# Patient Record
Sex: FEMALE | Race: WHITE | NOT HISPANIC OR LATINO | URBAN - METROPOLITAN AREA
[De-identification: names, ages, dates, MRNs, and addresses within clinical notes are randomized per-mention and may not be internally consistent; named-entity substitution may affect disease eponyms.]

---

## 2020-09-30 ENCOUNTER — OFFICE VISIT (OUTPATIENT)
Dept: URBAN - METROPOLITAN AREA CLINIC 121 | Facility: CLINIC | Age: 55
End: 2020-09-30

## 2021-03-11 ENCOUNTER — OFFICE VISIT (OUTPATIENT)
Dept: URBAN - METROPOLITAN AREA CLINIC 60 | Facility: CLINIC | Age: 56
End: 2021-03-11

## 2021-04-01 ENCOUNTER — OFFICE VISIT (OUTPATIENT)
Dept: URBAN - METROPOLITAN AREA CLINIC 60 | Facility: CLINIC | Age: 56
End: 2021-04-01

## 2021-04-14 ENCOUNTER — OFFICE VISIT (OUTPATIENT)
Dept: URBAN - METROPOLITAN AREA CLINIC 60 | Facility: CLINIC | Age: 56
End: 2021-04-14

## 2021-04-22 ENCOUNTER — OFFICE VISIT (OUTPATIENT)
Dept: URBAN - METROPOLITAN AREA CLINIC 60 | Facility: CLINIC | Age: 56
End: 2021-04-22

## 2021-04-29 ENCOUNTER — OFFICE VISIT (OUTPATIENT)
Dept: URBAN - METROPOLITAN AREA SURGERY CENTER 4 | Facility: SURGERY CENTER | Age: 56
End: 2021-04-29

## 2021-05-03 LAB — PATHOLOGY (INDENTED REPORT): (no result)

## 2021-05-11 ENCOUNTER — OFFICE VISIT (OUTPATIENT)
Dept: URBAN - METROPOLITAN AREA TELEHEALTH 2 | Facility: TELEHEALTH | Age: 56
End: 2021-05-11

## 2021-05-24 ENCOUNTER — OFFICE VISIT (OUTPATIENT)
Dept: URBAN - METROPOLITAN AREA SURGERY CENTER 4 | Facility: SURGERY CENTER | Age: 56
End: 2021-05-24

## 2021-06-08 ENCOUNTER — OFFICE VISIT (OUTPATIENT)
Dept: URBAN - METROPOLITAN AREA CLINIC 63 | Facility: CLINIC | Age: 56
End: 2021-06-08

## 2021-09-07 ENCOUNTER — OFFICE VISIT (OUTPATIENT)
Dept: URBAN - METROPOLITAN AREA CLINIC 121 | Facility: CLINIC | Age: 56
End: 2021-09-07

## 2021-10-19 ENCOUNTER — OFFICE VISIT (OUTPATIENT)
Dept: URBAN - METROPOLITAN AREA CLINIC 63 | Facility: CLINIC | Age: 56
End: 2021-10-19

## 2022-01-17 ENCOUNTER — OFFICE VISIT (OUTPATIENT)
Dept: URBAN - METROPOLITAN AREA CLINIC 63 | Facility: CLINIC | Age: 57
End: 2022-01-17

## 2022-06-06 ENCOUNTER — OFFICE VISIT (OUTPATIENT)
Dept: URBAN - METROPOLITAN AREA SURGERY CENTER 4 | Facility: SURGERY CENTER | Age: 57
End: 2022-06-06

## 2022-06-27 ENCOUNTER — OFFICE VISIT (OUTPATIENT)
Dept: URBAN - METROPOLITAN AREA SURGERY CENTER 4 | Facility: SURGERY CENTER | Age: 57
End: 2022-06-27

## 2022-06-28 ENCOUNTER — OFFICE VISIT (OUTPATIENT)
Dept: URBAN - METROPOLITAN AREA CLINIC 63 | Facility: CLINIC | Age: 57
End: 2022-06-28

## 2022-07-09 ENCOUNTER — TELEPHONE ENCOUNTER (OUTPATIENT)
Dept: URBAN - METROPOLITAN AREA CLINIC 121 | Facility: CLINIC | Age: 57
End: 2022-07-09

## 2022-07-09 RX ORDER — OMEPRAZOLE 40 MG/1
CAPSULE, DELAYED RELEASE ORAL ONCE A DAY
Refills: 3 | OUTPATIENT
Start: 2013-01-17 | End: 2014-05-21

## 2022-07-09 RX ORDER — OMEPRAZOLE 40 MG/1
CAPSULE, DELAYED RELEASE ORAL ONCE A DAY
Refills: 0 | OUTPATIENT
Start: 2016-07-07 | End: 2016-12-14

## 2022-07-09 RX ORDER — ELUXADOLINE 75 MG/1
TWICE A DAY TABLET, FILM COATED ORAL TWICE A DAY
Refills: 2 | OUTPATIENT
Start: 2017-03-08 | End: 2017-03-08

## 2022-07-09 RX ORDER — MONTELUKAST 10 MG/1
TABLET, FILM COATED ORAL
Refills: 0 | OUTPATIENT
Start: 2017-06-19 | End: 2017-12-19

## 2022-07-09 RX ORDER — ZOLPIDEM TARTRATE 5 MG/1
PRN TABLET, FILM COATED ORAL
Refills: 0 | OUTPATIENT
Start: 2017-06-19 | End: 2017-12-19

## 2022-07-09 RX ORDER — NYSTATIN 100000 U/G
PRN OINTMENT TOPICAL AS NEEDED
Refills: 2 | OUTPATIENT
Start: 2016-07-07 | End: 2016-12-14

## 2022-07-09 RX ORDER — ACYCLOVIR 200 MG/1
CAPSULE ORAL ONCE A DAY
Refills: 0 | OUTPATIENT
Start: 2021-10-19 | End: 2022-01-17

## 2022-07-09 RX ORDER — PREDNISONE 5 MG/1
TABLET ORAL ONCE A DAY
Refills: 0 | OUTPATIENT
Start: 2021-10-19 | End: 2022-01-17

## 2022-07-09 RX ORDER — DESONIDE 0.5 MG/G
CREAM TOPICAL AS NEEDED
Refills: 0 | OUTPATIENT
Start: 2017-03-08 | End: 2017-06-19

## 2022-07-09 RX ORDER — LISINOPRIL 10 MG/1
TABLET ORAL
Refills: 0 | OUTPATIENT
Start: 2015-12-02 | End: 2016-01-19

## 2022-07-09 RX ORDER — FLUTICASONE PROPIONATE 50 UG/1
SPRAY, METERED NASAL
Refills: 0 | OUTPATIENT
Start: 2017-03-08 | End: 2017-06-19

## 2022-07-09 RX ORDER — MESALAMINE 1.2 G/1
TAKE 2 TABLETS BY MOUTH TWICE A DAY TABLET, DELAYED RELEASE ORAL
Refills: 3 | OUTPATIENT
Start: 2018-07-26 | End: 2018-12-12

## 2022-07-09 RX ORDER — ACYCLOVIR 200 MG/1
CAPSULE ORAL ONCE A DAY
Refills: 2 | OUTPATIENT
Start: 2017-01-11 | End: 2017-03-08

## 2022-07-09 RX ORDER — PANCRELIPASE 36000; 180000; 114000 [USP'U]/1; [USP'U]/1; [USP'U]/1
TAKE 2 CAPSULES WITH EACH MEAL AND 1 WITH A SNACK CAPSULE, DELAYED RELEASE PELLETS ORAL
Refills: 3 | OUTPATIENT
Start: 2017-01-17 | End: 2017-03-08

## 2022-07-09 RX ORDER — AMLODIPINE BESYLATE 5 MG/1
TABLET ORAL
Refills: 0 | OUTPATIENT
Start: 2017-01-11 | End: 2017-03-08

## 2022-07-09 RX ORDER — TACROLIMUS 1 MG/1
2MG AM AND 1.5 MG PM CAPSULE, GELATIN COATED ORAL
Refills: 0 | OUTPATIENT
Start: 2017-06-19 | End: 2017-12-19

## 2022-07-09 RX ORDER — DEXTROMETHORPHAN POLISTIREX 30 MG/5 ML
SUSPENSION, EXTENDED RELEASE 12 HR ORAL ONCE A DAY
Refills: 2 | OUTPATIENT
Start: 2016-07-07 | End: 2016-12-14

## 2022-07-09 RX ORDER — MESALAMINE 1.2 G/1
TABLET, DELAYED RELEASE ORAL
Refills: 2 | OUTPATIENT
Start: 2016-01-19 | End: 2016-01-19

## 2022-07-09 RX ORDER — ACYCLOVIR 200 MG/1
CAPSULE ORAL
Refills: 0 | OUTPATIENT
Start: 2014-05-21 | End: 2015-09-01

## 2022-07-09 RX ORDER — OMEPRAZOLE 20 MG/1
TABLET, DELAYED RELEASE ORAL ONCE A DAY
Refills: 0 | OUTPATIENT
Start: 2015-12-02 | End: 2016-01-19

## 2022-07-09 RX ORDER — DEXTROMETHORPHAN POLISTIREX 30 MG/5 ML
SUSPENSION, EXTENDED RELEASE 12 HR ORAL ONCE A DAY
Refills: 2 | OUTPATIENT
Start: 2018-10-03 | End: 2018-12-12

## 2022-07-09 RX ORDER — FLUCONAZOLE 100 MG/1
2 PO TODAY, THEN ONE DAILY FOR 9 MORE DAYS TABLET ORAL TAKE AS DIRECTED
Refills: 0 | OUTPATIENT
Start: 2013-02-25 | End: 2018-10-03

## 2022-07-09 RX ORDER — PREDNISONE 20 MG/1
USE AS DIRECTEDONE DAILY FOR TWO WEEKS, THEN ONE HALF DAILY FOR TWO WEEKS, THEN OFF TABLET ORAL
Refills: 1 | OUTPATIENT
Start: 2015-08-12 | End: 2018-10-03

## 2022-07-09 RX ORDER — FLUTICASONE PROPIONATE 50 UG/1
SPRAY, METERED NASAL
Refills: 0 | OUTPATIENT
Start: 2016-12-14 | End: 2017-01-11

## 2022-07-09 RX ORDER — FEXOFENADINE HYDROCHLORIDE 180 MG/1
TABLET, FILM COATED ORAL
Refills: 0 | OUTPATIENT
Start: 2017-12-19 | End: 2018-10-03

## 2022-07-09 RX ORDER — ALBUTEROL SULFATE 90 UG/1
AEROSOL, METERED RESPIRATORY (INHALATION) ONCE A DAY
Refills: 2 | OUTPATIENT
Start: 2016-03-23 | End: 2016-07-07

## 2022-07-09 RX ORDER — LISINOPRIL 20 MG/1
TABLET ORAL ONCE A DAY
Refills: 0 | OUTPATIENT
Start: 2021-10-19 | End: 2022-01-17

## 2022-07-09 RX ORDER — FEXOFENADINE HYDROCHLORIDE 180 MG/1
TABLET, FILM COATED ORAL
Refills: 0 | OUTPATIENT
Start: 2017-06-19 | End: 2017-12-19

## 2022-07-09 RX ORDER — NORGESTIMATE AND ETHINYL ESTRADIOL 7DAYSX3 LO
KIT ORAL ONCE A DAY
Refills: 0 | OUTPATIENT
Start: 2019-06-04 | End: 2019-06-04

## 2022-07-09 RX ORDER — NYSTATIN 100000 U/G
PRN OINTMENT TOPICAL AS NEEDED
Refills: 2 | OUTPATIENT
Start: 2017-06-19 | End: 2017-12-19

## 2022-07-09 RX ORDER — FLUCONAZOLE 100 MG/1
USE AS DIRECTED: 2 ON FIRST DAY, THEN ONE DAILY FOR 9 DAYS TABLET ORAL
Refills: 0 | OUTPATIENT
Start: 2016-04-11 | End: 2018-10-03

## 2022-07-09 RX ORDER — NYSTATIN 100000 U/G
PRN OINTMENT TOPICAL AS NEEDED
Refills: 2 | OUTPATIENT
Start: 2016-12-14 | End: 2017-01-11

## 2022-07-09 RX ORDER — DESONIDE 0.5 MG/G
CREAM TOPICAL AS NEEDED
Refills: 0 | OUTPATIENT
Start: 2016-03-23 | End: 2016-07-07

## 2022-07-09 RX ORDER — ACYCLOVIR 200 MG/1
CAPSULE ORAL ONCE A DAY
Refills: 2 | OUTPATIENT
Start: 2017-03-08 | End: 2017-06-19

## 2022-07-09 RX ORDER — DEXTROMETHORPHAN POLISTIREX 30 MG/5 ML
SUSPENSION, EXTENDED RELEASE 12 HR ORAL
Refills: 0 | OUTPATIENT
Start: 2014-05-21 | End: 2015-09-01

## 2022-07-09 RX ORDER — OMEPRAZOLE 40 MG/1
CAPSULE, DELAYED RELEASE ORAL
Refills: 0 | OUTPATIENT
Start: 2012-05-02 | End: 2013-01-17

## 2022-07-09 RX ORDER — OMEPRAZOLE 40 MG/1
CAPSULE, DELAYED RELEASE ORAL ONCE A DAY
Refills: 0 | OUTPATIENT
Start: 2016-12-14 | End: 2017-01-11

## 2022-07-09 RX ORDER — FEXOFENADINE HCL 180 MG/1
TABLET, FILM COATED ORAL ONCE A DAY
Refills: 0 | OUTPATIENT
Start: 2016-07-07 | End: 2016-12-14

## 2022-07-09 RX ORDER — DIPHENOXYLATE HCL/ATROPINE 2.5-.025MG
1-2 UP TO FOUR TIMES A DAY TABLET ORAL
Refills: 3 | OUTPATIENT
Start: 2016-07-12 | End: 2016-12-14

## 2022-07-09 RX ORDER — ACYCLOVIR 200 MG/1
CAPSULE ORAL
Refills: 0 | OUTPATIENT
Start: 2012-05-02 | End: 2014-05-21

## 2022-07-09 RX ORDER — LISINOPRIL 10 MG/1
TABLET ORAL
Refills: 2 | OUTPATIENT
Start: 2016-01-19 | End: 2016-03-23

## 2022-07-09 RX ORDER — ACYCLOVIR 200 MG/1
CAPSULE ORAL ONCE A DAY
Refills: 2 | OUTPATIENT
Start: 2016-03-23 | End: 2016-07-07

## 2022-07-09 RX ORDER — PREDNISONE 5 MG/1
TABLET ORAL
Refills: 0 | OUTPATIENT
Start: 2015-09-01 | End: 2015-12-02

## 2022-07-09 RX ORDER — MYCOPHENOLATE MOFETIL 500 MG/1
TABLET, FILM COATED ORAL TWICE A DAY
Refills: 0 | OUTPATIENT
Start: 2019-06-04 | End: 2021-10-19

## 2022-07-09 RX ORDER — ELUXADOLINE 75 MG/1
TWICE A DAY TABLET, FILM COATED ORAL TWICE A DAY
Refills: 1 | OUTPATIENT
Start: 2016-06-01 | End: 2016-07-07

## 2022-07-09 RX ORDER — OMEPRAZOLE 20 MG/1
TABLET, DELAYED RELEASE ORAL ONCE A DAY
Refills: 0 | OUTPATIENT
Start: 2015-06-24 | End: 2015-09-01

## 2022-07-09 RX ORDER — MONTELUKAST 10 MG/1
TABLET, FILM COATED ORAL ONCE A DAY
Refills: 0 | OUTPATIENT
Start: 2018-10-03 | End: 2018-12-12

## 2022-07-09 RX ORDER — FLUCONAZOLE 100 MG/1
2 TODAY, THEN ONE A DAY FOR 9 DAYS TABLET ORAL TAKE AS DIRECTED
Refills: 0 | OUTPATIENT
Start: 2012-05-02 | End: 2018-10-03

## 2022-07-09 RX ORDER — MULTIVITAMIN
TABLET ORAL ONCE A DAY
Refills: 2 | OUTPATIENT
Start: 2017-03-08 | End: 2017-06-19

## 2022-07-09 RX ORDER — ELUXADOLINE 75 MG/1
TWICE A DAY TABLET, FILM COATED ORAL TWICE A DAY
Refills: 2 | OUTPATIENT
Start: 2017-03-09 | End: 2017-03-08

## 2022-07-09 RX ORDER — MYCOPHENOLATE MOFETIL 500 MG/1
TABLET, FILM COATED ORAL
Refills: 0 | OUTPATIENT
Start: 2012-05-02 | End: 2014-05-21

## 2022-07-09 RX ORDER — DIPHENOXYLATE HCL/ATROPINE 2.5-.025MG
TABLET ORAL
Refills: 0 | OUTPATIENT
Start: 2017-06-19 | End: 2017-06-19

## 2022-07-09 RX ORDER — ACYCLOVIR 200 MG/1
CAPSULE ORAL ONCE A DAY
Refills: 2 | OUTPATIENT
Start: 2018-10-03 | End: 2018-12-12

## 2022-07-09 RX ORDER — MYCOPHENOLATE MOFETIL 500 MG/1
TABLET, FILM COATED ORAL
Refills: 0 | OUTPATIENT
Start: 2015-12-02 | End: 2016-01-19

## 2022-07-09 RX ORDER — LISINOPRIL 5 MG/1
TABLET ORAL
Refills: 0 | OUTPATIENT
Start: 2014-05-21 | End: 2015-09-01

## 2022-07-09 RX ORDER — FEXOFENADINE HYDROCHLORIDE 180 MG/1
TABLET, FILM COATED ORAL
Refills: 0 | OUTPATIENT
Start: 2012-05-02 | End: 2014-05-21

## 2022-07-09 RX ORDER — ESTRADIOL 2 MG/1
RING VAGINAL
Refills: 0 | OUTPATIENT
Start: 2016-03-23 | End: 2016-07-07

## 2022-07-09 RX ORDER — LISINOPRIL 5 MG/1
TABLET ORAL
Refills: 0 | OUTPATIENT
Start: 2015-12-02 | End: 2015-12-02

## 2022-07-09 RX ORDER — NORETHINDRONE ACETATE AND ETHINYL ESTRADIOL, ETHINYL ESTRADIOL AND FERROUS FUMARATE 1MG-10(24)
KIT ORAL
Refills: 0 | OUTPATIENT
Start: 2015-12-02 | End: 2016-01-19

## 2022-07-09 RX ORDER — ACYCLOVIR 200 MG/1
CAPSULE ORAL
Refills: 0 | OUTPATIENT
Start: 2015-12-02 | End: 2016-01-19

## 2022-07-09 RX ORDER — TACROLIMUS 0.5 MG/1
2 AM 1 PM CAPSULE ORAL
Refills: 2 | OUTPATIENT
Start: 2016-04-20 | End: 2016-07-07

## 2022-07-09 RX ORDER — ALBUTEROL SULFATE 90 UG/1
AEROSOL, METERED RESPIRATORY (INHALATION) ONCE A DAY
Refills: 2 | OUTPATIENT
Start: 2017-12-19 | End: 2018-10-03

## 2022-07-09 RX ORDER — TACROLIMUS 1 MG/1
CAPSULE ORAL
Refills: 0 | OUTPATIENT
Start: 2018-10-03 | End: 2018-12-12

## 2022-07-09 RX ORDER — OMEPRAZOLE 40 MG/1
CAPSULE, DELAYED RELEASE ORAL ONCE A DAY
Refills: 0 | OUTPATIENT
Start: 2017-03-08 | End: 2017-06-19

## 2022-07-09 RX ORDER — GLUCOSAMINE/MSM/CHONDROIT SULF 500-166.6
TABLET ORAL TWICE A DAY
Refills: 2 | OUTPATIENT
Start: 2017-06-19 | End: 2017-12-19

## 2022-07-09 RX ORDER — CHOLECALCIFEROL (VITAMIN D3) 1250 MCG
CAPSULE ORAL ONCE A DAY
Refills: 0 | OUTPATIENT
Start: 2021-10-19 | End: 2022-01-17

## 2022-07-09 RX ORDER — FERROUS SULFATE 325(65) MG
TABLET ORAL ONCE A DAY
Refills: 2 | OUTPATIENT
Start: 2017-12-19 | End: 2018-10-03

## 2022-07-09 RX ORDER — ALBUTEROL SULFATE 90 UG/1
AEROSOL, METERED RESPIRATORY (INHALATION) ONCE A DAY
Refills: 2 | OUTPATIENT
Start: 2016-01-19 | End: 2016-03-23

## 2022-07-09 RX ORDER — MULTIVITAMIN
TABLET ORAL ONCE A DAY
Refills: 2 | OUTPATIENT
Start: 2017-12-19 | End: 2018-10-03

## 2022-07-09 RX ORDER — MESALAMINE 1.2 G/1
2 TWICE A DAY TABLET, DELAYED RELEASE ORAL
Refills: 3 | OUTPATIENT
Start: 2016-09-08 | End: 2016-12-14

## 2022-07-09 RX ORDER — ACYCLOVIR 200 MG/1
CAPSULE ORAL ONCE A DAY
Refills: 2 | OUTPATIENT
Start: 2017-12-19 | End: 2018-10-03

## 2022-07-09 RX ORDER — GLUCOSAMINE/MSM/CHONDROIT SULF 500-166.6
TABLET ORAL TWICE A DAY
Refills: 2 | OUTPATIENT
Start: 2018-12-12 | End: 2019-06-04

## 2022-07-09 RX ORDER — GLUCOSAMINE/MSM/CHONDROIT SULF 500-166.6
TABLET ORAL TWICE A DAY
Refills: 2 | OUTPATIENT
Start: 2021-10-19 | End: 2022-01-17

## 2022-07-09 RX ORDER — LISINOPRIL 40 MG/1
15 MG AM25 MG PM TABLET ORAL
Refills: 0 | OUTPATIENT
Start: 2016-07-07 | End: 2016-12-14

## 2022-07-09 RX ORDER — FEXOFENADINE HCL 180 MG/1
TABLET ORAL
Refills: 0 | OUTPATIENT
Start: 2015-09-01 | End: 2015-12-02

## 2022-07-09 RX ORDER — TACROLIMUS 0.5 MG/1
CAPSULE ORAL
Refills: 2 | OUTPATIENT
Start: 2016-01-19 | End: 2016-03-23

## 2022-07-09 RX ORDER — PREDNISONE 2.5 MG/1
TABLET ORAL ONCE A DAY
Refills: 0 | OUTPATIENT
Start: 2017-06-19 | End: 2017-12-19

## 2022-07-09 RX ORDER — MESALAMINE 1.2 G/1
TAKE 2 TABLETS BY MOUTH TWICE A DAY TABLET, DELAYED RELEASE ORAL
Refills: 3 | OUTPATIENT
Start: 2017-08-07 | End: 2018-07-26

## 2022-07-09 RX ORDER — OXYCODONE AND ACETAMINOPHEN 5; 325 MG/1; MG/1
PRN TABLET ORAL
Refills: 0 | OUTPATIENT
Start: 2017-03-08 | End: 2017-06-19

## 2022-07-09 RX ORDER — DESONIDE 0.5 MG/G
CREAM TOPICAL AS NEEDED
Refills: 0 | OUTPATIENT
Start: 2016-12-14 | End: 2017-01-11

## 2022-07-09 RX ORDER — ACYCLOVIR 200 MG/1
CAPSULE ORAL ONCE A DAY
Refills: 2 | OUTPATIENT
Start: 2016-12-14 | End: 2016-12-14

## 2022-07-09 RX ORDER — MESALAMINE 1.2 G/1
TABLET, DELAYED RELEASE ORAL
Refills: 0 | OUTPATIENT
Start: 2015-09-01 | End: 2015-12-02

## 2022-07-09 RX ORDER — AZELASTINE HYDROCHLORIDE AND FLUTICASONE PROPIONATE 137; 50 UG/1; UG/1
SPRAY, METERED NASAL TWICE A DAY
Refills: 0 | OUTPATIENT
Start: 2016-06-01 | End: 2016-07-07

## 2022-07-09 RX ORDER — FEXOFENADINE HYDROCHLORIDE 180 MG/1
TABLET, FILM COATED ORAL
Refills: 0 | OUTPATIENT
Start: 2017-03-08 | End: 2017-06-19

## 2022-07-09 RX ORDER — GLUCOSAMINE/MSM/CHONDROIT SULF 500-166.6
TABLET ORAL TWICE A DAY
Refills: 2 | OUTPATIENT
Start: 2017-01-11 | End: 2017-03-08

## 2022-07-09 RX ORDER — ACYCLOVIR 200 MG/1
CAPSULE ORAL
Refills: 2 | OUTPATIENT
Start: 2016-01-19 | End: 2016-03-23

## 2022-07-09 RX ORDER — OMEPRAZOLE 40 MG/1
CAPSULE, DELAYED RELEASE ORAL ONCE A DAY
Refills: 0 | OUTPATIENT
Start: 2017-01-11 | End: 2017-03-08

## 2022-07-09 RX ORDER — ALBUTEROL SULFATE 90 UG/1
AEROSOL, METERED RESPIRATORY (INHALATION) ONCE A DAY
Refills: 2 | OUTPATIENT
Start: 2017-01-11 | End: 2017-03-08

## 2022-07-09 RX ORDER — PREDNISONE 2.5 MG/1
TABLET ORAL ONCE A DAY
Refills: 0 | OUTPATIENT
Start: 2017-03-08 | End: 2017-06-19

## 2022-07-09 RX ORDER — MONTELUKAST 10 MG/1
TABLET, FILM COATED ORAL ONCE A DAY
Refills: 0 | OUTPATIENT
Start: 2019-06-04 | End: 2021-10-19

## 2022-07-09 RX ORDER — MONTELUKAST 10 MG/1
TABLET, FILM COATED ORAL ONCE A DAY
Refills: 0 | OUTPATIENT
Start: 2018-12-12 | End: 2019-06-04

## 2022-07-09 RX ORDER — MYCOPHENOLATE MOFETIL 500 MG/1
TABLET, FILM COATED ORAL TWICE A DAY
Refills: 0 | OUTPATIENT
Start: 2017-12-19 | End: 2018-10-03

## 2022-07-09 RX ORDER — DIPHENOXYLATE HCL/ATROPINE 2.5-.025MG
2 FOUR TIMES A DAY TABLET ORAL
Refills: 3 | OUTPATIENT
Start: 2017-01-05 | End: 2017-08-07

## 2022-07-09 RX ORDER — TACROLIMUS 5 MG/1
CAPSULE ORAL
Refills: 0 | OUTPATIENT
Start: 2012-05-02 | End: 2014-05-21

## 2022-07-09 RX ORDER — MYCOPHENOLATE MOFETIL 500 MG/1
TABLET, FILM COATED ORAL TWICE A DAY
Refills: 0 | OUTPATIENT
Start: 2021-10-19 | End: 2022-01-17

## 2022-07-09 RX ORDER — FERROUS SULFATE 325(65) MG
TABLET ORAL ONCE A DAY
Refills: 2 | OUTPATIENT
Start: 2018-10-03 | End: 2018-12-12

## 2022-07-09 RX ORDER — MYCOPHENOLATE MOFETIL 500 MG/1
TABLET, FILM COATED ORAL TWICE A DAY
Refills: 0 | OUTPATIENT
Start: 2016-07-07 | End: 2016-12-14

## 2022-07-09 RX ORDER — ALBUTEROL SULFATE 90 UG/1
AEROSOL, METERED RESPIRATORY (INHALATION) ONCE A DAY
Refills: 2 | OUTPATIENT
Start: 2016-07-07 | End: 2016-12-14

## 2022-07-09 RX ORDER — PREDNISONE 5 MG/1
TABLET ORAL ONCE A DAY
Refills: 0 | OUTPATIENT
Start: 2019-06-04 | End: 2021-10-19

## 2022-07-09 RX ORDER — ELUXADOLINE 100 MG/1
TWICE A DAY TABLET, FILM COATED ORAL TWICE A DAY
Refills: 3 | OUTPATIENT
Start: 2016-07-07 | End: 2016-07-12

## 2022-07-09 RX ORDER — HYDROCORTISONE ACETATE 25 MG/1
SUPPOSITORY RECTAL TAKE AS DIRECTED
Refills: 3 | OUTPATIENT
Start: 2012-05-02 | End: 2018-10-03

## 2022-07-09 RX ORDER — NAFTIFINE HYDROCHLORIDE 10 MG/G
GEL TOPICAL ONCE A DAY
Refills: 2 | OUTPATIENT
Start: 2016-01-19 | End: 2016-03-23

## 2022-07-09 RX ORDER — FEXOFENADINE HYDROCHLORIDE 180 MG/1
TABLET, FILM COATED ORAL
Refills: 0 | OUTPATIENT
Start: 2016-12-14 | End: 2017-01-11

## 2022-07-09 RX ORDER — MESALAMINE 1.2 G/1
2 TWICE A DAY TABLET, DELAYED RELEASE ORAL
Refills: 3 | OUTPATIENT
Start: 2020-06-03 | End: 2021-05-11

## 2022-07-09 RX ORDER — MONTELUKAST 10 MG/1
TABLET, FILM COATED ORAL
Refills: 0 | OUTPATIENT
Start: 2017-03-08 | End: 2017-06-19

## 2022-07-09 RX ORDER — ZOLPIDEM TARTRATE 5 MG/1
PRN TABLET, FILM COATED ORAL
Refills: 0 | OUTPATIENT
Start: 2017-03-08 | End: 2017-06-19

## 2022-07-09 RX ORDER — GLUCOSAMINE/MSM/CHONDROIT SULF 500-166.6
TABLET ORAL TWICE A DAY
Refills: 2 | OUTPATIENT
Start: 2016-12-14 | End: 2017-01-11

## 2022-07-09 RX ORDER — PREDNISONE 5 MG/1
TABLET ORAL ONCE A DAY
Refills: 0 | OUTPATIENT
Start: 2017-06-19 | End: 2017-12-19

## 2022-07-09 RX ORDER — TACROLIMUS 5 MG/1
2 MG IN AM1.5 MG IN PM CAPSULE, GELATIN COATED ORAL
Refills: 0 | OUTPATIENT
Start: 2016-07-07 | End: 2016-12-14

## 2022-07-09 RX ORDER — ONDANSETRON HYDROCHLORIDE 4 MG/1
THREE TIMES A DAY AS NEEDED FOR NAUSEA TABLET, FILM COATED ORAL THREE TIMES A DAY
Refills: 3 | OUTPATIENT
Start: 2021-04-14 | End: 2021-10-19

## 2022-07-09 RX ORDER — OMEPRAZOLE 40 MG/1
CAPSULE, DELAYED RELEASE ORAL ONCE A DAY
Refills: 0 | OUTPATIENT
Start: 2017-12-19 | End: 2018-10-03

## 2022-07-09 RX ORDER — PREDNISONE 2.5 MG/1
TABLET ORAL ONCE A DAY
Refills: 0 | OUTPATIENT
Start: 2017-01-11 | End: 2017-03-08

## 2022-07-09 RX ORDER — MULTIVITAMIN
TABLET ORAL ONCE A DAY
Refills: 2 | OUTPATIENT
Start: 2017-06-19 | End: 2017-12-19

## 2022-07-09 RX ORDER — TACROLIMUS 0.5 MG/1
CAPSULE ORAL
Refills: 2 | OUTPATIENT
Start: 2016-03-23 | End: 2016-04-20

## 2022-07-09 RX ORDER — MESALAMINE 1.2 G/1
2 TWICE A DAY TABLET, DELAYED RELEASE ORAL
Refills: 5 | OUTPATIENT
Start: 2015-08-12 | End: 2015-12-02

## 2022-07-09 RX ORDER — MESALAMINE 1.2 G/1
2 TWICE A DAY TABLET, DELAYED RELEASE ORAL
Refills: 3 | OUTPATIENT
Start: 2015-09-08 | End: 2016-03-23

## 2022-07-09 RX ORDER — MESALAMINE 1.2 G/1
2 TWICE A DAY TABLET, DELAYED RELEASE ORAL
Refills: 1 | OUTPATIENT
Start: 2019-04-09 | End: 2019-09-30

## 2022-07-09 RX ORDER — MONTELUKAST 10 MG/1
TABLET, FILM COATED ORAL
Refills: 0 | OUTPATIENT
Start: 2016-12-14 | End: 2017-01-11

## 2022-07-09 RX ORDER — MYCOPHENOLATE MOFETIL 500 MG/1
TABLET, FILM COATED ORAL TWICE A DAY
Refills: 0 | OUTPATIENT
Start: 2018-10-03 | End: 2018-12-12

## 2022-07-09 RX ORDER — DIPHENOXYLATE HCL/ATROPINE 2.5-.025MG
2 THREE TIMES A DAY TABLET ORAL
Refills: 5 | OUTPATIENT
Start: 2016-11-03 | End: 2016-12-14

## 2022-07-09 RX ORDER — MONTELUKAST 10 MG/1
TABLET, FILM COATED ORAL ONCE A DAY
Refills: 0 | OUTPATIENT
Start: 2021-10-19 | End: 2022-01-17

## 2022-07-09 RX ORDER — NAFTIFINE HYDROCHLORIDE 10 MG/G
GEL TOPICAL ONCE A DAY
Refills: 2 | OUTPATIENT
Start: 2017-01-11 | End: 2017-03-08

## 2022-07-09 RX ORDER — AZELASTINE HYDROCHLORIDE AND FLUTICASONE PROPIONATE 137; 50 UG/1; UG/1
SPRAY, METERED NASAL TWICE A DAY
Refills: 0 | OUTPATIENT
Start: 2016-07-07 | End: 2016-12-14

## 2022-07-09 RX ORDER — OXYCODONE AND ACETAMINOPHEN 5; 325 MG/1; MG/1
PRN TABLET ORAL
Refills: 0 | OUTPATIENT
Start: 2017-12-19 | End: 2018-10-03

## 2022-07-09 RX ORDER — OMEPRAZOLE 40 MG/1
CAPSULE, DELAYED RELEASE ORAL ONCE A DAY
Refills: 0 | OUTPATIENT
Start: 2018-10-03 | End: 2018-12-12

## 2022-07-09 RX ORDER — FERROUS SULFATE 325(65) MG
TABLET ORAL ONCE A DAY
Refills: 2 | OUTPATIENT
Start: 2017-06-19 | End: 2017-12-19

## 2022-07-09 RX ORDER — GLUCOSAMINE/MSM/CHONDROIT SULF 500-166.6
TABLET ORAL ONCE A DAY
Refills: 2 | OUTPATIENT
Start: 2016-03-23 | End: 2016-07-07

## 2022-07-09 RX ORDER — MESALAMINE 1.2 G/1
2 TWICE A DAY TABLET, DELAYED RELEASE ORAL
Refills: 3 | OUTPATIENT
Start: 2021-05-11 | End: 2022-01-17

## 2022-07-09 RX ORDER — FERROUS SULFATE 325(65) MG
TABLET ORAL
Refills: 2 | OUTPATIENT
Start: 2016-03-23 | End: 2016-07-07

## 2022-07-09 RX ORDER — PREDNISONE 5 MG/1
TABLET ORAL
Refills: 0 | OUTPATIENT
Start: 2014-05-21 | End: 2015-09-01

## 2022-07-09 RX ORDER — FERROUS SULFATE 325(65) MG
TABLET ORAL
Refills: 0 | OUTPATIENT
Start: 2014-05-21 | End: 2015-09-01

## 2022-07-09 RX ORDER — ELUXADOLINE 100 MG/1
TWICE A DAY TABLET, FILM COATED ORAL TWICE A DAY
Refills: 1 | OUTPATIENT
Start: 2016-07-07 | End: 2016-07-07

## 2022-07-09 RX ORDER — NAFTIFINE HYDROCHLORIDE 10 MG/G
GEL TOPICAL ONCE A DAY
Refills: 2 | OUTPATIENT
Start: 2016-12-14 | End: 2017-01-11

## 2022-07-09 RX ORDER — FERROUS SULFATE 325(65) MG
TABLET ORAL
Refills: 2 | OUTPATIENT
Start: 2016-01-19 | End: 2016-03-23

## 2022-07-09 RX ORDER — TACROLIMUS 0.5 MG/1
CAPSULE ORAL
Refills: 0 | OUTPATIENT
Start: 2021-03-18 | End: 2021-10-19

## 2022-07-09 RX ORDER — DIPHENOXYLATE HCL/ATROPINE 2.5-.025MG
USE AS DIRECTED UP TO 8 TABLETS PER DAY AS NEEDED TO CONTROL DIARRHEA TABLET ORAL
Refills: 1 | OUTPATIENT
Start: 2016-06-09 | End: 2016-07-07

## 2022-07-09 RX ORDER — PREDNISONE 2.5 MG/1
TABLET ORAL ONCE A DAY
Refills: 0 | OUTPATIENT
Start: 2016-07-07 | End: 2016-12-14

## 2022-07-09 RX ORDER — NAFTIFINE HYDROCHLORIDE 10 MG/G
GEL TOPICAL ONCE A DAY
Refills: 2 | OUTPATIENT
Start: 2017-12-19 | End: 2018-10-03

## 2022-07-09 RX ORDER — ACYCLOVIR 200 MG/1
CAPSULE ORAL
Refills: 0 | OUTPATIENT
Start: 2014-05-16 | End: 2014-05-21

## 2022-07-09 RX ORDER — DIPHENOXYLATE HCL/ATROPINE 2.5-.025MG
TAKE 2 TABLETS BY MOUTH 4 TIMES A DAY TABLET ORAL
Refills: 2 | OUTPATIENT
Start: 2018-02-16 | End: 2019-06-04

## 2022-07-09 RX ORDER — TACROLIMUS 0.5 MG/1
CAPSULE ORAL
Refills: 0 | OUTPATIENT
Start: 2015-12-02 | End: 2016-01-19

## 2022-07-09 RX ORDER — FEXOFENADINE HYDROCHLORIDE AND PSEUDOEPHEDRINE HYDROCHLORIDE 60; 120 MG/1; MG/1
TABLET, FILM COATED, EXTENDED RELEASE ORAL ONCE A DAY
Refills: 2 | OUTPATIENT
Start: 2016-01-19 | End: 2016-03-23

## 2022-07-09 RX ORDER — PREDNISONE 5 MG/1
TABLET ORAL ONCE A DAY
Refills: 0 | OUTPATIENT
Start: 2018-12-12 | End: 2019-06-04

## 2022-07-09 RX ORDER — POTASSIUM CHLORIDE 1500 MG/1
40 MG TABLET, FILM COATED, EXTENDED RELEASE ORAL ONCE A DAY
Refills: 0 | OUTPATIENT
Start: 2019-06-04 | End: 2021-10-19

## 2022-07-09 RX ORDER — MULTIVITAMIN
TABLET ORAL ONCE A DAY
Refills: 2 | OUTPATIENT
Start: 2016-01-19 | End: 2016-03-23

## 2022-07-09 RX ORDER — MYCOPHENOLATE MOFETIL 500 MG/1
TABLET, FILM COATED ORAL
Refills: 0 | OUTPATIENT
Start: 2015-09-01 | End: 2015-12-02

## 2022-07-09 RX ORDER — MONTELUKAST 10 MG/1
TABLET, FILM COATED ORAL
Refills: 0 | OUTPATIENT
Start: 2017-01-11 | End: 2017-03-08

## 2022-07-09 RX ORDER — PREDNISONE 5 MG/1
TABLET ORAL ONCE A DAY
Refills: 0 | OUTPATIENT
Start: 2016-07-07 | End: 2016-12-14

## 2022-07-09 RX ORDER — FERROUS SULFATE 325(65) MG
TABLET ORAL
Refills: 0 | OUTPATIENT
Start: 2015-09-01 | End: 2015-12-02

## 2022-07-09 RX ORDER — DESONIDE 0.5 MG/G
CREAM TOPICAL AS NEEDED
Refills: 0 | OUTPATIENT
Start: 2017-06-19 | End: 2017-12-19

## 2022-07-09 RX ORDER — PREDNISONE 2.5 MG/1
TABLET ORAL ONCE A DAY
Refills: 0 | OUTPATIENT
Start: 2016-12-14 | End: 2017-01-11

## 2022-07-09 RX ORDER — GLUCOSAMINE/MSM/CHONDROIT SULF 500-166.6
TABLET ORAL ONCE A DAY
Refills: 2 | OUTPATIENT
Start: 2016-01-19 | End: 2016-03-23

## 2022-07-09 RX ORDER — DEXTROMETHORPHAN POLISTIREX 30 MG/5 ML
SUSPENSION, EXTENDED RELEASE 12 HR ORAL
Refills: 2 | OUTPATIENT
Start: 2016-01-19 | End: 2016-03-23

## 2022-07-09 RX ORDER — PANCRELIPASE 36000; 180000; 114000 [USP'U]/1; [USP'U]/1; [USP'U]/1
TAKE 3 CAPSULES WITH EACH MEAL AND 1 WITH A SNACK CAPSULE, DELAYED RELEASE PELLETS ORAL
Refills: 3 | OUTPATIENT
Start: 2018-05-14 | End: 2019-12-16

## 2022-07-09 RX ORDER — POTASSIUM CHLORIDE 1.5 G/1
POWDER, FOR SOLUTION ORAL
Refills: 0 | OUTPATIENT
Start: 2014-05-16 | End: 2014-05-21

## 2022-07-09 RX ORDER — PREDNISONE 5 MG/1
TABLET ORAL
Refills: 0 | OUTPATIENT
Start: 2012-05-02 | End: 2014-05-21

## 2022-07-09 RX ORDER — DESONIDE 0.5 MG/G
CREAM TOPICAL AS NEEDED
Refills: 0 | OUTPATIENT
Start: 2017-12-19 | End: 2018-10-03

## 2022-07-09 RX ORDER — GLUCOSAMINE/MSM/CHONDROIT SULF 500-166.6
TABLET ORAL TWICE A DAY
Refills: 2 | OUTPATIENT
Start: 2019-06-04 | End: 2021-10-19

## 2022-07-09 RX ORDER — TACROLIMUS 1 MG/1
2MG AM AND 1.5 MG PM CAPSULE, GELATIN COATED ORAL
Refills: 0 | OUTPATIENT
Start: 2017-01-11 | End: 2017-03-08

## 2022-07-09 RX ORDER — OMEPRAZOLE 40 MG/1
CAPSULE, DELAYED RELEASE ORAL ONCE A DAY
Refills: 0 | OUTPATIENT
Start: 2019-06-04 | End: 2021-10-19

## 2022-07-09 RX ORDER — NORETHINDRONE ACETATE AND ETHINYL ESTRADIOL, ETHINYL ESTRADIOL AND FERROUS FUMARATE 1MG-10(24)
KIT ORAL
Refills: 2 | OUTPATIENT
Start: 2016-01-19 | End: 2016-03-23

## 2022-07-09 RX ORDER — NYSTATIN 100000 U/G
PRN OINTMENT TOPICAL AS NEEDED
Refills: 2 | OUTPATIENT
Start: 2016-03-23 | End: 2016-07-07

## 2022-07-09 RX ORDER — OMEPRAZOLE 40 MG/1
CAPSULE, DELAYED RELEASE ORAL ONCE A DAY
Refills: 0 | OUTPATIENT
Start: 2017-06-19 | End: 2017-12-19

## 2022-07-09 RX ORDER — DEXTROMETHORPHAN POLISTIREX 30 MG/5 ML
SUSPENSION, EXTENDED RELEASE 12 HR ORAL ONCE A DAY
Refills: 2 | OUTPATIENT
Start: 2017-12-19 | End: 2018-10-03

## 2022-07-09 RX ORDER — AZELASTINE HYDROCHLORIDE AND FLUTICASONE PROPIONATE 137; 50 UG/1; UG/1
SPRAY, METERED NASAL
Refills: 0 | OUTPATIENT
Start: 2014-05-16 | End: 2014-05-21

## 2022-07-09 RX ORDER — FERROUS SULFATE 325(65) MG
TABLET ORAL ONCE A DAY
Refills: 2 | OUTPATIENT
Start: 2017-01-11 | End: 2017-03-08

## 2022-07-09 RX ORDER — GLUCOSAMINE/MSM/CHONDROIT SULF 500-166.6
TABLET ORAL TWICE A DAY
Refills: 2 | OUTPATIENT
Start: 2016-07-07 | End: 2016-12-14

## 2022-07-09 RX ORDER — PREDNISONE 5 MG/1
TABLET ORAL ONCE A DAY
Refills: 0 | OUTPATIENT
Start: 2017-12-19 | End: 2018-10-03

## 2022-07-09 RX ORDER — GLUCOSAMINE/MSM/CHONDROIT SULF 500-166.6
TABLET ORAL ONCE A DAY
Refills: 0 | OUTPATIENT
Start: 2015-12-02 | End: 2016-01-19

## 2022-07-09 RX ORDER — PANTOPRAZOLE SODIUM 40 MG/1
TABLET, DELAYED RELEASE ORAL ONCE A DAY
Refills: 0 | OUTPATIENT
Start: 2022-01-10 | End: 2022-01-17

## 2022-07-09 RX ORDER — MYCOPHENOLATE MOFETIL 500 MG/1
TABLET, FILM COATED ORAL TWICE A DAY
Refills: 0 | OUTPATIENT
Start: 2017-01-11 | End: 2017-03-08

## 2022-07-09 RX ORDER — GABAPENTIN 300 MG/1
CAPSULE ORAL ONCE A DAY
Refills: 0 | OUTPATIENT
Start: 2021-10-19 | End: 2022-01-17

## 2022-07-09 RX ORDER — OMEPRAZOLE 20 MG/1
CAPSULE, DELAYED RELEASE ORAL TWICE A DAY
Refills: 3 | OUTPATIENT
Start: 2014-05-21 | End: 2014-06-18

## 2022-07-09 RX ORDER — OMEPRAZOLE 40 MG/1
CAPSULE, DELAYED RELEASE ORAL ONCE A DAY
Refills: 0 | OUTPATIENT
Start: 2018-12-12 | End: 2019-06-04

## 2022-07-09 RX ORDER — TACROLIMUS 0.5 MG/1
CAPSULE ORAL
Refills: 0 | OUTPATIENT
Start: 2015-09-01 | End: 2015-12-02

## 2022-07-09 RX ORDER — FLUTICASONE PROPIONATE 50 UG/1
SPRAY, METERED NASAL
Refills: 0 | OUTPATIENT
Start: 2017-12-19 | End: 2018-10-03

## 2022-07-09 RX ORDER — LISINOPRIL 5 MG/1
TABLET ORAL
Refills: 0 | OUTPATIENT
Start: 2014-05-16 | End: 2014-05-21

## 2022-07-09 RX ORDER — FEXOFENADINE HCL 180 MG/1
TABLET ORAL
Refills: 0 | OUTPATIENT
Start: 2015-12-02 | End: 2016-01-19

## 2022-07-09 RX ORDER — OXYCODONE AND ACETAMINOPHEN 5; 325 MG/1; MG/1
PRN TABLET ORAL
Refills: 0 | OUTPATIENT
Start: 2017-01-11 | End: 2017-03-08

## 2022-07-09 RX ORDER — LISINOPRIL 5 MG/1
15MG AM/25MG PM TABLET ORAL
Refills: 0 | OUTPATIENT
Start: 2016-03-23 | End: 2016-07-07

## 2022-07-09 RX ORDER — DEXTROMETHORPHAN POLISTIREX 30 MG/5 ML
SUSPENSION, EXTENDED RELEASE 12 HR ORAL ONCE A DAY
Refills: 2 | OUTPATIENT
Start: 2017-03-08 | End: 2017-06-19

## 2022-07-09 RX ORDER — LISINOPRIL 5 MG/1
TABLET ORAL
Refills: 0 | OUTPATIENT
Start: 2015-09-01 | End: 2015-12-02

## 2022-07-09 RX ORDER — TACROLIMUS 0.5 MG/1
CAPSULE ORAL
Refills: 0 | OUTPATIENT
Start: 2014-05-19 | End: 2014-05-21

## 2022-07-09 RX ORDER — MESALAMINE 1.2 G/1
TABLET, DELAYED RELEASE ORAL
Refills: 0 | OUTPATIENT
Start: 2015-12-02 | End: 2016-01-19

## 2022-07-09 RX ORDER — ZOLPIDEM TARTRATE 12.5 MG
TABLET, EXTENDED RELEASE MULTIPHASE ORAL
Refills: 0 | OUTPATIENT
Start: 2012-05-02 | End: 2014-05-21

## 2022-07-09 RX ORDER — NORGESTIMATE AND ETHINYL ESTRADIOL 7DAYSX3 LO
KIT ORAL ONCE A DAY
Refills: 0 | OUTPATIENT
Start: 2018-10-03 | End: 2018-12-12

## 2022-07-09 RX ORDER — ACYCLOVIR 200 MG/1
CAPSULE ORAL
Refills: 0 | OUTPATIENT
Start: 2015-09-01 | End: 2015-12-02

## 2022-07-09 RX ORDER — ACYCLOVIR 200 MG/1
CAPSULE ORAL ONCE A DAY
Refills: 2 | OUTPATIENT
Start: 2017-06-19 | End: 2017-06-19

## 2022-07-09 RX ORDER — FERROUS SULFATE 325(65) MG
TABLET ORAL ONCE A DAY
Refills: 2 | OUTPATIENT
Start: 2018-12-12 | End: 2019-06-04

## 2022-07-09 RX ORDER — POLYETHYLENE GLYCOL 3350, SODIUM SULFATE, SODIUM CHLORIDE, POTASSIUM CHLORIDE, ASCORBIC ACID, SODIUM ASCORBATE 7.5-2.691G
KIT ORAL TAKE AS DIRECTED
Refills: 0 | OUTPATIENT
Start: 2013-01-09 | End: 2018-10-03

## 2022-07-09 RX ORDER — NYSTATIN 100000 U/G
PRN OINTMENT TOPICAL AS NEEDED
Refills: 2 | OUTPATIENT
Start: 2017-12-19 | End: 2018-10-03

## 2022-07-09 RX ORDER — DESONIDE 0.5 MG/G
CREAM TOPICAL AS NEEDED
Refills: 0 | OUTPATIENT
Start: 2017-01-11 | End: 2017-03-08

## 2022-07-09 RX ORDER — MULTIVITAMIN
TABLET ORAL ONCE A DAY
Refills: 2 | OUTPATIENT
Start: 2016-03-23 | End: 2016-07-07

## 2022-07-09 RX ORDER — MULTIVITAMIN
TABLET ORAL ONCE A DAY
Refills: 2 | OUTPATIENT
Start: 2016-07-07 | End: 2016-12-14

## 2022-07-09 RX ORDER — FEXOFENADINE HCL 180 MG/1
TABLET ORAL
Refills: 0 | OUTPATIENT
Start: 2014-05-21 | End: 2015-09-01

## 2022-07-09 RX ORDER — BISMUTH SUBSALICYLATE 525 MG/1
TABLET ORAL TWICE A DAY
Refills: 0 | OUTPATIENT
Start: 2021-10-19 | End: 2022-01-17

## 2022-07-09 RX ORDER — DEXTROMETHORPHAN POLISTIREX 30 MG/5 ML
SUSPENSION, EXTENDED RELEASE 12 HR ORAL
Refills: 0 | OUTPATIENT
Start: 2015-09-01 | End: 2015-12-02

## 2022-07-09 RX ORDER — PREDNISONE 5 MG/1
TABLET ORAL ONCE A DAY
Refills: 0 | OUTPATIENT
Start: 2018-10-03 | End: 2018-12-12

## 2022-07-09 RX ORDER — FERROUS SULFATE 325(65) MG
TABLET ORAL
Refills: 0 | OUTPATIENT
Start: 2015-12-02 | End: 2016-01-19

## 2022-07-09 RX ORDER — OMEPRAZOLE 40 MG/1
CAPSULE, DELAYED RELEASE ORAL ONCE A DAY
Refills: 0 | OUTPATIENT
Start: 2022-01-17 | End: 2022-01-17

## 2022-07-09 RX ORDER — SODIUM PICOSULFATE, MAGNESIUM OXIDE, AND ANHYDROUS CITRIC ACID 10; 3.5; 12 MG/160ML; G/160ML; G/160ML
USE AS DIRECTED FOR COLONOSCOPY PREP LIQUID ORAL
Refills: 0 | OUTPATIENT
Start: 2018-10-03 | End: 2018-12-12

## 2022-07-09 RX ORDER — DIPHENOXYLATE HCL/ATROPINE 2.5-.025MG
2 FOUR TIMES A DAY TABLET ORAL
Refills: 3 | OUTPATIENT
Start: 2017-08-07 | End: 2018-02-16

## 2022-07-09 RX ORDER — SODIUM PICOSULFATE, MAGNESIUM OXIDE, AND ANHYDROUS CITRIC ACID 10; 3.5; 12 MG/160ML; G/160ML; G/160ML
USE AS DIRECTED LIQUID ORAL
Refills: 0 | OUTPATIENT
Start: 2021-04-14 | End: 2021-05-11

## 2022-07-09 RX ORDER — OXYCODONE AND ACETAMINOPHEN 5; 325 MG/1; MG/1
PRN TABLET ORAL
Refills: 0 | OUTPATIENT
Start: 2017-06-19 | End: 2017-12-19

## 2022-07-09 RX ORDER — PREDNISONE 10 MG/1
TABLET ORAL
Refills: 0 | OUTPATIENT
Start: 2015-12-02 | End: 2016-01-19

## 2022-07-09 RX ORDER — AZELASTINE HYDROCHLORIDE AND FLUTICASONE PROPIONATE 137; 50 UG/1; UG/1
SPRAY, METERED NASAL
Refills: 0 | OUTPATIENT
Start: 2014-05-21 | End: 2014-05-21

## 2022-07-09 RX ORDER — ELUXADOLINE 75 MG/1
TWICE A DAY TABLET, FILM COATED ORAL TWICE A DAY
Refills: 2 | OUTPATIENT
Start: 2017-03-09 | End: 2018-10-03

## 2022-07-09 RX ORDER — OMEPRAZOLE 40 MG/1
CAPSULE, DELAYED RELEASE ORAL ONCE A DAY
Refills: 0 | OUTPATIENT
Start: 2021-10-19 | End: 2022-01-17

## 2022-07-09 RX ORDER — NORGESTIMATE AND ETHINYL ESTRADIOL 7DAYSX3 LO
KIT ORAL ONCE A DAY
Refills: 0 | OUTPATIENT
Start: 2018-12-12 | End: 2019-06-04

## 2022-07-09 RX ORDER — MONTELUKAST 10 MG/1
TABLET, FILM COATED ORAL
Refills: 0 | OUTPATIENT
Start: 2017-12-19 | End: 2018-10-03

## 2022-07-09 RX ORDER — NYSTATIN 100000 U/G
PRN OINTMENT TOPICAL ONCE A DAY
Refills: 2 | OUTPATIENT
Start: 2016-01-19 | End: 2016-03-23

## 2022-07-09 RX ORDER — LISINOPRIL 20 MG/1
TABLET ORAL ONCE A DAY
Refills: 0 | OUTPATIENT
Start: 2018-12-12 | End: 2019-06-04

## 2022-07-09 RX ORDER — PREDNISONE 10 MG/1
TABLET ORAL
Refills: 2 | OUTPATIENT
Start: 2016-01-19 | End: 2016-03-23

## 2022-07-09 RX ORDER — MYCOPHENOLATE MOFETIL 500 MG/1
TABLET, FILM COATED ORAL
Refills: 0 | OUTPATIENT
Start: 2014-05-21 | End: 2015-09-01

## 2022-07-09 RX ORDER — PREDNISONE 5 MG/1
TABLET ORAL ONCE A DAY
Refills: 0 | OUTPATIENT
Start: 2016-12-14 | End: 2017-01-11

## 2022-07-09 RX ORDER — POTASSIUM CHLORIDE 1500 MG/1
40 MG TABLET, FILM COATED, EXTENDED RELEASE ORAL ONCE A DAY
Refills: 0 | OUTPATIENT
Start: 2018-10-03 | End: 2018-12-12

## 2022-07-09 RX ORDER — TACROLIMUS 0.5 MG/1
CAPSULE ORAL
Refills: 0 | OUTPATIENT
Start: 2014-05-21 | End: 2015-09-01

## 2022-07-09 RX ORDER — MESALAMINE 1.2 G/1
2.4 BID TABLET, DELAYED RELEASE ORAL
Refills: 0 | OUTPATIENT
Start: 2017-01-11 | End: 2017-03-08

## 2022-07-09 RX ORDER — OMEPRAZOLE 20 MG/1
TABLET, DELAYED RELEASE ORAL ONCE A DAY
Refills: 2 | OUTPATIENT
Start: 2016-01-19 | End: 2016-03-23

## 2022-07-09 RX ORDER — GLUCOSAMINE/MSM/CHONDROIT SULF 500-166.6
TABLET ORAL ONCE A DAY
Refills: 0 | OUTPATIENT
Start: 2015-06-24 | End: 2015-09-01

## 2022-07-09 RX ORDER — PREDNISONE 10 MG/1
7.5 TABLET ORAL
Refills: 2 | OUTPATIENT
Start: 2016-07-07 | End: 2016-07-07

## 2022-07-09 RX ORDER — GLUCOSAMINE/MSM/CHONDROIT SULF 500-166.6
TABLET ORAL TWICE A DAY
Refills: 2 | OUTPATIENT
Start: 2017-03-08 | End: 2017-06-19

## 2022-07-09 RX ORDER — ACYCLOVIR 200 MG/1
CAPSULE ORAL ONCE A DAY
Refills: 2 | OUTPATIENT
Start: 2016-07-07 | End: 2016-12-14

## 2022-07-09 RX ORDER — MESALAMINE 1.2 G/1
TAKE 2 TABLETS BY MOUTH TWICE A DAY TABLET, DELAYED RELEASE ORAL
Refills: 1 | OUTPATIENT
Start: 2019-09-30 | End: 2020-06-03

## 2022-07-09 RX ORDER — ALPRAZOLAM 0.5 MG
TABLET ORAL AS NEEDED
Refills: 0 | OUTPATIENT
Start: 2019-06-04 | End: 2021-10-19

## 2022-07-09 RX ORDER — PREDNISONE 5 MG/1
TABLET ORAL ONCE A DAY
Refills: 0 | OUTPATIENT
Start: 2017-01-11 | End: 2017-03-08

## 2022-07-09 RX ORDER — PREDNISONE 10 MG/1
7.5 TABLET ORAL
Refills: 2 | OUTPATIENT
Start: 2016-03-23 | End: 2016-07-07

## 2022-07-09 RX ORDER — MULTIVITAMIN
TABLET ORAL ONCE A DAY
Refills: 2 | OUTPATIENT
Start: 2017-01-11 | End: 2017-03-08

## 2022-07-09 RX ORDER — MYCOPHENOLATE MOFETIL 500 MG/1
TABLET, FILM COATED ORAL TWICE A DAY
Refills: 0 | OUTPATIENT
Start: 2017-03-08 | End: 2017-06-19

## 2022-07-09 RX ORDER — POTASSIUM CHLORIDE 1500 MG/1
40 MG TABLET, FILM COATED, EXTENDED RELEASE ORAL ONCE A DAY
Refills: 0 | OUTPATIENT
Start: 2018-12-12 | End: 2019-06-04

## 2022-07-09 RX ORDER — PREDNISONE 2.5 MG/1
TABLET ORAL ONCE A DAY
Refills: 0 | OUTPATIENT
Start: 2017-12-19 | End: 2018-10-03

## 2022-07-09 RX ORDER — DEXTROMETHORPHAN POLISTIREX 30 MG/5 ML
SUSPENSION, EXTENDED RELEASE 12 HR ORAL ONCE A DAY
Refills: 2 | OUTPATIENT
Start: 2017-01-11 | End: 2017-03-08

## 2022-07-09 RX ORDER — ESTRADIOL AND NORETHINDRONE ACETATE .5; .1 MG/1; MG/1
TABLET, FILM COATED ORAL ONCE A DAY
Refills: 0 | OUTPATIENT
Start: 2021-10-19 | End: 2022-01-17

## 2022-07-09 RX ORDER — NAFTIFINE HYDROCHLORIDE 10 MG/G
GEL TOPICAL ONCE A DAY
Refills: 2 | OUTPATIENT
Start: 2016-03-23 | End: 2016-07-07

## 2022-07-09 RX ORDER — POTASSIUM CHLORIDE 1.5 G/1
POWDER, FOR SOLUTION ORAL
Refills: 0 | OUTPATIENT
Start: 2014-05-21 | End: 2015-06-24

## 2022-07-09 RX ORDER — NAFTIFINE HYDROCHLORIDE 10 MG/G
GEL TOPICAL ONCE A DAY
Refills: 2 | OUTPATIENT
Start: 2017-03-08 | End: 2017-06-19

## 2022-07-09 RX ORDER — DESONIDE 0.5 MG/G
CREAM TOPICAL AS NEEDED
Refills: 0 | OUTPATIENT
Start: 2016-07-07 | End: 2016-12-14

## 2022-07-09 RX ORDER — TACROLIMUS 1 MG/1
2MG AM AND 1.5 MG PM CAPSULE, GELATIN COATED ORAL
Refills: 0 | OUTPATIENT
Start: 2016-12-14 | End: 2017-01-11

## 2022-07-09 RX ORDER — GLUCOSAMINE/MSM/CHONDROIT SULF 500-166.6
TABLET ORAL ONCE A DAY
Refills: 0 | OUTPATIENT
Start: 2015-09-01 | End: 2015-12-02

## 2022-07-09 RX ORDER — ELUXADOLINE 75 MG/1
TWICE A DAY TABLET, FILM COATED ORAL TWICE A DAY
Refills: 3 | OUTPATIENT
Start: 2017-12-19 | End: 2018-10-03

## 2022-07-09 RX ORDER — FERROUS SULFATE 325(65) MG
TABLET ORAL ONCE A DAY
Refills: 2 | OUTPATIENT
Start: 2016-07-07 | End: 2016-12-14

## 2022-07-09 RX ORDER — ALBUTEROL SULFATE 90 UG/1
AEROSOL, METERED RESPIRATORY (INHALATION) ONCE A DAY
Refills: 2 | OUTPATIENT
Start: 2017-06-19 | End: 2017-12-19

## 2022-07-09 RX ORDER — POTASSIUM CHLORIDE 1500 MG/1
40 MG TABLET, FILM COATED, EXTENDED RELEASE ORAL ONCE A DAY
Refills: 0 | OUTPATIENT
Start: 2021-10-19 | End: 2022-01-17

## 2022-07-09 RX ORDER — MYCOPHENOLATE MOFETIL 500 MG/1
TABLET, FILM COATED ORAL TWICE A DAY
Refills: 0 | OUTPATIENT
Start: 2017-06-19 | End: 2017-12-19

## 2022-07-09 RX ORDER — ALPRAZOLAM 0.5 MG
TABLET ORAL AS NEEDED
Refills: 0 | OUTPATIENT
Start: 2018-10-03 | End: 2018-12-12

## 2022-07-09 RX ORDER — OMEPRAZOLE 20 MG/1
TABLET, DELAYED RELEASE ORAL ONCE A DAY
Refills: 0 | OUTPATIENT
Start: 2015-09-01 | End: 2015-12-02

## 2022-07-09 RX ORDER — FERROUS SULFATE 325(65) MG
TABLET ORAL ONCE A DAY
Refills: 2 | OUTPATIENT
Start: 2016-12-14 | End: 2017-01-11

## 2022-07-09 RX ORDER — ZOLPIDEM TARTRATE 5 MG/1
PRN TABLET, FILM COATED ORAL
Refills: 0 | OUTPATIENT
Start: 2017-12-19 | End: 2018-10-03

## 2022-07-09 RX ORDER — ACYCLOVIR 200 MG/1
CAPSULE ORAL ONCE A DAY
Refills: 2 | OUTPATIENT
Start: 2017-06-19 | End: 2017-12-19

## 2022-07-09 RX ORDER — ZOLPIDEM TARTRATE 5 MG/1
PRN TABLET, FILM COATED ORAL
Refills: 0 | OUTPATIENT
Start: 2017-01-11 | End: 2017-03-08

## 2022-07-09 RX ORDER — ALBUTEROL SULFATE 90 UG/1
AEROSOL, METERED RESPIRATORY (INHALATION) ONCE A DAY
Refills: 2 | OUTPATIENT
Start: 2017-03-08 | End: 2017-06-19

## 2022-07-09 RX ORDER — MYCOPHENOLATE MOFETIL 500 MG/1
TABLET, FILM COATED ORAL TWICE A DAY
Refills: 0 | OUTPATIENT
Start: 2018-12-12 | End: 2019-06-04

## 2022-07-09 RX ORDER — LISINOPRIL 20 MG/1
TABLET ORAL ONCE A DAY
Refills: 0 | OUTPATIENT
Start: 2019-06-04 | End: 2021-10-19

## 2022-07-09 RX ORDER — FERROUS SULFATE 325(65) MG
TABLET ORAL ONCE A DAY
Refills: 2 | OUTPATIENT
Start: 2017-03-08 | End: 2017-06-19

## 2022-07-09 RX ORDER — NYSTATIN 100000 U/G
PRN OINTMENT TOPICAL AS NEEDED
Refills: 2 | OUTPATIENT
Start: 2017-03-08 | End: 2017-06-19

## 2022-07-09 RX ORDER — ELUXADOLINE 75 MG/1
TWICE A DAY TABLET, FILM COATED ORAL TWICE A DAY
Refills: 2 | OUTPATIENT
Start: 2016-12-14 | End: 2017-03-08

## 2022-07-09 RX ORDER — ACYCLOVIR 200 MG/1
CAPSULE ORAL ONCE A DAY
Refills: 2 | OUTPATIENT
Start: 2016-12-14 | End: 2017-01-11

## 2022-07-09 RX ORDER — PREDNISONE 5 MG/1
TABLET ORAL ONCE A DAY
Refills: 0 | OUTPATIENT
Start: 2017-03-08 | End: 2017-06-19

## 2022-07-09 RX ORDER — PANCRELIPASE 36000; 180000; 114000 [USP'U]/1; [USP'U]/1; [USP'U]/1
TAKE 3 CAPSULES WITH EACH MEAL AND 1 WITH A SNACK CAPSULE, DELAYED RELEASE PELLETS ORAL
Refills: 3 | OUTPATIENT
Start: 2017-03-08 | End: 2018-05-14

## 2022-07-09 RX ORDER — TACROLIMUS 1 MG/1
2MG AM AND 1.5 MG PM CAPSULE, GELATIN COATED ORAL
Refills: 0 | OUTPATIENT
Start: 2017-12-19 | End: 2018-10-03

## 2022-07-09 RX ORDER — LISINOPRIL 20 MG/1
TABLET ORAL ONCE A DAY
Refills: 0 | OUTPATIENT
Start: 2018-10-03 | End: 2018-12-12

## 2022-07-09 RX ORDER — NYSTATIN 100000 U/G
PRN OINTMENT TOPICAL AS NEEDED
Refills: 2 | OUTPATIENT
Start: 2017-01-11 | End: 2017-03-08

## 2022-07-09 RX ORDER — MYCOPHENOLATE MOFETIL 500 MG/1
TABLET, FILM COATED ORAL
Refills: 2 | OUTPATIENT
Start: 2016-01-19 | End: 2016-03-23

## 2022-07-09 RX ORDER — GLUCOSAMINE/MSM/CHONDROIT SULF 500-166.6
TABLET ORAL TWICE A DAY
Refills: 2 | OUTPATIENT
Start: 2018-10-03 | End: 2018-12-12

## 2022-07-09 RX ORDER — DEXTROMETHORPHAN POLISTIREX 30 MG/5 ML
SUSPENSION, EXTENDED RELEASE 12 HR ORAL ONCE A DAY
Refills: 2 | OUTPATIENT
Start: 2017-06-19 | End: 2017-12-19

## 2022-07-09 RX ORDER — ALPRAZOLAM 0.5 MG
TABLET ORAL AS NEEDED
Refills: 0 | OUTPATIENT
Start: 2021-10-19 | End: 2022-01-17

## 2022-07-09 RX ORDER — TACROLIMUS 1 MG/1
2MG AM AND 1.5 MG PM CAPSULE, GELATIN COATED ORAL
Refills: 0 | OUTPATIENT
Start: 2017-03-08 | End: 2017-06-19

## 2022-07-09 RX ORDER — MESALAMINE 1.2 G/1
2.4 BID TABLET, DELAYED RELEASE ORAL
Refills: 0 | OUTPATIENT
Start: 2017-06-19 | End: 2017-12-19

## 2022-07-09 RX ORDER — BISMUTH SUBSALICYLATE 525 MG/1
TABLET ORAL TWICE A DAY
Refills: 0 | OUTPATIENT
Start: 2018-12-12 | End: 2021-10-19

## 2022-07-09 RX ORDER — OMEPRAZOLE 40 MG/1
CAPSULE, DELAYED RELEASE ORAL ONCE A DAY
Refills: 0 | OUTPATIENT
Start: 2016-03-23 | End: 2016-07-07

## 2022-07-09 RX ORDER — AMLODIPINE BESYLATE 5 MG/1
TABLET ORAL
Refills: 0 | OUTPATIENT
Start: 2017-03-08 | End: 2017-06-19

## 2022-07-09 RX ORDER — MYCOPHENOLATE MOFETIL 500 MG/1
TABLET, FILM COATED ORAL TWICE A DAY
Refills: 2 | OUTPATIENT
Start: 2016-03-23 | End: 2016-07-07

## 2022-07-09 RX ORDER — BISMUTH SUBSALICYLATE 525 MG/1
TABLET ORAL TWICE A DAY
Refills: 0 | OUTPATIENT
Start: 2018-10-03 | End: 2018-12-12

## 2022-07-09 RX ORDER — NAFTIFINE HYDROCHLORIDE 10 MG/G
GEL TOPICAL ONCE A DAY
Refills: 2 | OUTPATIENT
Start: 2016-07-07 | End: 2016-12-14

## 2022-07-09 RX ORDER — NAFTIFINE HYDROCHLORIDE 10 MG/G
GEL TOPICAL ONCE A DAY
Refills: 2 | OUTPATIENT
Start: 2017-06-19 | End: 2017-12-19

## 2022-07-09 RX ORDER — MESALAMINE 1.2 G/1
2.4 BID TABLET, DELAYED RELEASE ORAL
Refills: 0 | OUTPATIENT
Start: 2016-03-23 | End: 2016-07-07

## 2022-07-09 RX ORDER — ALPRAZOLAM 0.5 MG
TABLET ORAL AS NEEDED
Refills: 0 | OUTPATIENT
Start: 2018-12-12 | End: 2019-06-04

## 2022-07-09 RX ORDER — DEXTROMETHORPHAN POLISTIREX 30 MG/5 ML
SUSPENSION, EXTENDED RELEASE 12 HR ORAL ONCE A DAY
Refills: 2 | OUTPATIENT
Start: 2016-03-23 | End: 2016-07-07

## 2022-07-09 RX ORDER — MESALAMINE 1.2 G/1
2.4 BID TABLET, DELAYED RELEASE ORAL
Refills: 0 | OUTPATIENT
Start: 2016-07-07 | End: 2016-12-14

## 2022-07-09 RX ORDER — FLUTICASONE PROPIONATE 50 UG/1
SPRAY, METERED NASAL
Refills: 0 | OUTPATIENT
Start: 2017-06-19 | End: 2017-12-19

## 2022-07-09 RX ORDER — PANCRELIPASE 36000; 180000; 114000 [USP'U]/1; [USP'U]/1; [USP'U]/1
USE AS DIRECTED TAKE 2 CAPSULES WITH EACH MEAL AND 1 WITH SNACKS UP TO 8 CAPSULES PER DAY CAPSULE, DELAYED RELEASE PELLETS ORAL
Refills: 3 | OUTPATIENT
Start: 2021-04-14 | End: 2022-01-17

## 2022-07-09 RX ORDER — ALBUTEROL SULFATE 90 UG/1
AEROSOL, METERED RESPIRATORY (INHALATION) ONCE A DAY
Refills: 2 | OUTPATIENT
Start: 2016-12-14 | End: 2017-01-11

## 2022-07-09 RX ORDER — MESALAMINE 1.2 G/1
2.4 BID TABLET, DELAYED RELEASE ORAL
Refills: 0 | OUTPATIENT
Start: 2016-12-14 | End: 2017-01-11

## 2022-07-09 RX ORDER — DEXTROMETHORPHAN POLISTIREX 30 MG/5 ML
SUSPENSION, EXTENDED RELEASE 12 HR ORAL ONCE A DAY
Refills: 2 | OUTPATIENT
Start: 2016-12-14 | End: 2017-01-11

## 2022-07-09 RX ORDER — ZOLPIDEM TARTRATE 5 MG/1
PRN TABLET, FILM COATED ORAL
Refills: 0 | OUTPATIENT
Start: 2016-12-14 | End: 2017-01-11

## 2022-07-09 RX ORDER — DEXTROMETHORPHAN POLISTIREX 30 MG/5 ML
SUSPENSION, EXTENDED RELEASE 12 HR ORAL
Refills: 0 | OUTPATIENT
Start: 2015-12-02 | End: 2016-01-19

## 2022-07-09 RX ORDER — AMLODIPINE BESYLATE 5 MG/1
TABLET ORAL
Refills: 0 | OUTPATIENT
Start: 2016-12-14 | End: 2017-01-11

## 2022-07-09 RX ORDER — FLUTICASONE PROPIONATE 50 UG/1
SPRAY, METERED NASAL
Refills: 0 | OUTPATIENT
Start: 2017-01-11 | End: 2017-03-08

## 2022-07-09 RX ORDER — MESALAMINE 1.2 G/1
2.4 BID TABLET, DELAYED RELEASE ORAL
Refills: 0 | OUTPATIENT
Start: 2017-03-08 | End: 2017-06-19

## 2022-07-09 RX ORDER — FEXOFENADINE HYDROCHLORIDE 180 MG/1
TABLET, FILM COATED ORAL
Refills: 0 | OUTPATIENT
Start: 2017-01-11 | End: 2017-03-08

## 2022-07-09 RX ORDER — OXYCODONE AND ACETAMINOPHEN 5; 325 MG/1; MG/1
PRN TABLET ORAL
Refills: 0 | OUTPATIENT
Start: 2016-12-14 | End: 2017-01-11

## 2022-07-09 RX ORDER — GLUCOSAMINE/MSM/CHONDROIT SULF 500-166.6
TABLET ORAL TWICE A DAY
Refills: 2 | OUTPATIENT
Start: 2017-12-19 | End: 2018-10-03

## 2022-07-09 RX ORDER — ONDANSETRON HYDROCHLORIDE 4 MG/1
THREE TIMES A DAY AS NEEDED FOR NAUSEA TABLET, FILM COATED ORAL THREE TIMES A DAY
Refills: 1 | OUTPATIENT
Start: 2018-08-30 | End: 2021-04-14

## 2022-07-09 RX ORDER — FEXOFENADINE HCL 180 MG/1
TABLET ORAL
Refills: 2 | OUTPATIENT
Start: 2016-01-19 | End: 2016-03-23

## 2022-07-09 RX ORDER — MULTIVITAMIN
TABLET ORAL ONCE A DAY
Refills: 2 | OUTPATIENT
Start: 2016-12-14 | End: 2017-01-11

## 2022-07-09 RX ORDER — MYCOPHENOLATE MOFETIL 500 MG/1
TABLET, FILM COATED ORAL TWICE A DAY
Refills: 0 | OUTPATIENT
Start: 2016-12-14 | End: 2017-01-11

## 2022-07-09 RX ORDER — DIPHENOXYLATE HCL/ATROPINE 2.5-.025MG
2 TAB TID TABLET ORAL
Refills: 0 | OUTPATIENT
Start: 2016-12-14 | End: 2016-12-19

## 2022-07-09 RX ORDER — AMLODIPINE BESYLATE 5 MG/1
TABLET ORAL
Refills: 0 | OUTPATIENT
Start: 2017-06-19 | End: 2017-06-19

## 2022-07-10 ENCOUNTER — TELEPHONE ENCOUNTER (OUTPATIENT)
Dept: URBAN - METROPOLITAN AREA CLINIC 121 | Facility: CLINIC | Age: 57
End: 2022-07-10

## 2022-07-10 RX ORDER — SODIUM PICOSULFATE, MAGNESIUM OXIDE, AND ANHYDROUS CITRIC ACID 10; 3.5; 12 MG/160ML; G/160ML; G/160ML
USE AS DIRECTED LIQUID ORAL
Refills: 0 | Status: ACTIVE | COMMUNITY
Start: 2022-01-17

## 2022-07-10 RX ORDER — BISMUTH SUBSALICYLATE 525 MG/1
TABLET ORAL TWICE A DAY
Refills: 0 | Status: ACTIVE | COMMUNITY
Start: 2022-01-17

## 2022-07-10 RX ORDER — TACROLIMUS 0.5 MG/1
CAPSULE ORAL TWICE A DAY
Refills: 0 | Status: ACTIVE | COMMUNITY
Start: 2021-10-19

## 2022-07-10 RX ORDER — ALPRAZOLAM 0.5 MG
TABLET ORAL AS NEEDED
Refills: 0 | Status: ACTIVE | COMMUNITY
Start: 2022-01-17

## 2022-07-10 RX ORDER — ESTRADIOL AND NORETHINDRONE ACETATE .5; .1 MG/1; MG/1
TABLET, FILM COATED ORAL ONCE A DAY
Refills: 0 | Status: ACTIVE | COMMUNITY
Start: 2022-01-17

## 2022-07-10 RX ORDER — PREDNISONE 5 MG/1
TABLET ORAL ONCE A DAY
Refills: 0 | Status: ACTIVE | COMMUNITY
Start: 2022-01-17

## 2022-07-10 RX ORDER — DEXTROMETHORPHAN POLISTIREX 30 MG/5 ML
SUSPENSION, EXTENDED RELEASE 12 HR ORAL ONCE A DAY
Refills: 2 | Status: ACTIVE | COMMUNITY
Start: 2018-12-12

## 2022-07-10 RX ORDER — GLUCOSAMINE/MSM/CHONDROIT SULF 500-166.6
TABLET ORAL TWICE A DAY
Refills: 2 | Status: ACTIVE | COMMUNITY
Start: 2022-01-17

## 2022-07-10 RX ORDER — PANCRELIPASE 36000; 180000; 114000 [USP'U]/1; [USP'U]/1; [USP'U]/1
TAKE 3 CAPSULES WITH EACH MEAL AND 1 WITH A SNACK CAPSULE, DELAYED RELEASE PELLETS ORAL
Refills: 3 | Status: ACTIVE | COMMUNITY
Start: 2019-12-16

## 2022-07-10 RX ORDER — TACROLIMUS 1 MG/1
CAPSULE ORAL
Refills: 0 | Status: ACTIVE | COMMUNITY
Start: 2022-01-14

## 2022-07-10 RX ORDER — ONDANSETRON HYDROCHLORIDE 4 MG/1
USE AS DIRECTED TAKE 1 TABLET 30 MINUTES PRIOR TO EACH HALF OF COLONOSCOPY PREP TO PREVENT NAUSEA TABLET, FILM COATED ORAL
Refills: 0 | Status: ACTIVE | COMMUNITY
Start: 2022-01-17

## 2022-07-10 RX ORDER — MONTELUKAST 10 MG/1
TABLET, FILM COATED ORAL ONCE A DAY
Refills: 0 | Status: ACTIVE | COMMUNITY
Start: 2022-01-17

## 2022-07-10 RX ORDER — POTASSIUM CHLORIDE 1500 MG/1
40 MG TABLET, FILM COATED, EXTENDED RELEASE ORAL ONCE A DAY
Refills: 0 | Status: ACTIVE | COMMUNITY
Start: 2022-01-17

## 2022-07-10 RX ORDER — DIPHENOXYLATE HCL/ATROPINE 2.5-.025MG
1-2 UP TO FOUR TIMES A DAY TABLET ORAL
Refills: 3 | Status: ACTIVE | COMMUNITY
Start: 2016-07-12

## 2022-07-10 RX ORDER — MYCOPHENOLATE MOFETIL 500 MG/1
TABLET, FILM COATED ORAL TWICE A DAY
Refills: 0 | Status: ACTIVE | COMMUNITY
Start: 2022-01-17

## 2022-07-10 RX ORDER — GABAPENTIN 300 MG/1
CAPSULE ORAL ONCE A DAY
Refills: 0 | Status: ACTIVE | COMMUNITY
Start: 2022-01-17

## 2022-07-10 RX ORDER — FERROUS SULFATE 325(65) MG
TABLET ORAL ONCE A DAY
Refills: 2 | Status: ACTIVE | COMMUNITY
Start: 2019-06-04

## 2022-07-10 RX ORDER — PANTOPRAZOLE SODIUM 40 MG/1
TABLET, DELAYED RELEASE ORAL ONCE A DAY
Refills: 0 | Status: ACTIVE | COMMUNITY
Start: 2022-01-17

## 2022-07-10 RX ORDER — LISINOPRIL 20 MG/1
TABLET ORAL ONCE A DAY
Refills: 0 | Status: ACTIVE | COMMUNITY
Start: 2022-01-17

## 2022-07-10 RX ORDER — ACYCLOVIR 200 MG/1
CAPSULE ORAL ONCE A DAY
Refills: 0 | Status: ACTIVE | COMMUNITY
Start: 2022-01-17

## 2022-07-10 RX ORDER — CHOLECALCIFEROL (VITAMIN D3) 1250 MCG
CAPSULE ORAL ONCE A DAY
Refills: 0 | Status: ACTIVE | COMMUNITY
Start: 2022-01-17

## 2022-07-10 RX ORDER — VEDOLIZUMAB 300 MG/5ML
EVERY 8 WEEKS INJECTION, POWDER, LYOPHILIZED, FOR SOLUTION INTRAVENOUS
Refills: 0 | Status: ACTIVE | COMMUNITY
Start: 2022-01-17

## 2022-07-10 RX ORDER — TACROLIMUS 1 MG/1
CAPSULE ORAL
Refills: 0 | Status: ACTIVE | COMMUNITY
Start: 2018-12-12

## 2022-07-10 RX ORDER — PANCRELIPASE 36000; 180000; 114000 [USP'U]/1; [USP'U]/1; [USP'U]/1
USE AS DIRECTED TAKE 2 CAPSULES WITH EACH MEAL AND 1 WITH SNACKS UP TO 8 CAPSULES PER DAY CAPSULE, DELAYED RELEASE PELLETS ORAL
Refills: 3 | Status: ACTIVE | COMMUNITY
Start: 2022-01-17

## 2022-07-10 RX ORDER — ACYCLOVIR 200 MG/1
CAPSULE ORAL ONCE A DAY
Refills: 2 | Status: ACTIVE | COMMUNITY
Start: 2018-12-12

## 2022-07-10 RX ORDER — ELUXADOLINE 75 MG/1
TWICE A DAY TABLET, FILM COATED ORAL TWICE A DAY
Refills: 3 | Status: ACTIVE | COMMUNITY
Start: 2017-06-19

## 2022-07-10 RX ORDER — DIPHENOXYLATE HCL/ATROPINE 2.5-.025MG
USE AS DIRECTED 2 THREE TIMES PER DAY AS NEEDED FOR DIARRHEA TABLET ORAL
Refills: 5 | Status: ACTIVE | COMMUNITY
Start: 2019-06-04

## 2022-07-30 ENCOUNTER — TELEPHONE ENCOUNTER (OUTPATIENT)
Age: 57
End: 2022-07-30

## 2022-07-31 ENCOUNTER — TELEPHONE ENCOUNTER (OUTPATIENT)
Age: 57
End: 2022-07-31

## 2022-09-11 ENCOUNTER — WEB ENCOUNTER (OUTPATIENT)
Dept: URBAN - METROPOLITAN AREA SURGERY CENTER 4 | Facility: SURGERY CENTER | Age: 57
End: 2022-09-11

## 2022-09-12 ENCOUNTER — OFFICE VISIT (OUTPATIENT)
Dept: URBAN - METROPOLITAN AREA SURGERY CENTER 4 | Facility: SURGERY CENTER | Age: 57
End: 2022-09-12
Payer: COMMERCIAL

## 2022-09-12 ENCOUNTER — CLAIMS CREATED FROM THE CLAIM WINDOW (OUTPATIENT)
Dept: URBAN - METROPOLITAN AREA CLINIC 4 | Facility: CLINIC | Age: 57
End: 2022-09-12
Payer: COMMERCIAL

## 2022-09-12 DIAGNOSIS — K52.3 INDETERMINATE COLITIS: ICD-10-CM

## 2022-09-12 DIAGNOSIS — K51.80 OTHER ULCERATIVE COLITIS WITHOUT COMPLICATIONS: ICD-10-CM

## 2022-09-12 DIAGNOSIS — K57.30 DIVERTCULOSIS OF LG INT W/O PERFORATION OR ABSCESS W/O BLEEDING: ICD-10-CM

## 2022-09-12 DIAGNOSIS — K64.0 GRADE I INTERNAL HEMORRHOIDS: ICD-10-CM

## 2022-09-12 DIAGNOSIS — K51.00 ULCERATIVE COLITIS, UNIVERSAL, WITHOUT COMPLICATIONS: ICD-10-CM

## 2022-09-12 PROCEDURE — 45380 COLONOSCOPY AND BIOPSY: CPT | Performed by: INTERNAL MEDICINE

## 2022-09-12 PROCEDURE — G8907 PT DOC NO EVENTS ON DISCHARG: HCPCS | Performed by: INTERNAL MEDICINE

## 2022-09-12 PROCEDURE — G8918 PT W/O PREOP ORDER IV AB PRO: HCPCS | Performed by: INTERNAL MEDICINE

## 2022-09-12 PROCEDURE — 88305 TISSUE EXAM BY PATHOLOGIST: CPT | Performed by: PATHOLOGY

## 2022-09-12 RX ORDER — PANCRELIPASE 36000; 180000; 114000 [USP'U]/1; [USP'U]/1; [USP'U]/1
TAKE 3 CAPSULES WITH EACH MEAL AND 1 WITH A SNACK CAPSULE, DELAYED RELEASE PELLETS ORAL
Refills: 3 | Status: ACTIVE | COMMUNITY
Start: 2019-12-16

## 2022-09-12 RX ORDER — LISINOPRIL 20 MG/1
TABLET ORAL ONCE A DAY
Refills: 0 | Status: ACTIVE | COMMUNITY
Start: 2022-01-17

## 2022-09-12 RX ORDER — ELUXADOLINE 75 MG/1
TWICE A DAY TABLET, FILM COATED ORAL TWICE A DAY
Refills: 3 | Status: ACTIVE | COMMUNITY
Start: 2017-06-19

## 2022-09-12 RX ORDER — SODIUM PICOSULFATE, MAGNESIUM OXIDE, AND ANHYDROUS CITRIC ACID 10; 3.5; 12 MG/160ML; G/160ML; G/160ML
USE AS DIRECTED LIQUID ORAL
Refills: 0 | Status: ACTIVE | COMMUNITY
Start: 2022-01-17

## 2022-09-12 RX ORDER — TACROLIMUS 1 MG/1
CAPSULE ORAL
Refills: 0 | Status: ACTIVE | COMMUNITY
Start: 2018-12-12

## 2022-09-12 RX ORDER — GABAPENTIN 300 MG/1
CAPSULE ORAL ONCE A DAY
Refills: 0 | Status: ACTIVE | COMMUNITY
Start: 2022-01-17

## 2022-09-12 RX ORDER — DIPHENOXYLATE HCL/ATROPINE 2.5-.025MG
1-2 UP TO FOUR TIMES A DAY TABLET ORAL
Refills: 3 | Status: ACTIVE | COMMUNITY
Start: 2016-07-12

## 2022-09-12 RX ORDER — ONDANSETRON HYDROCHLORIDE 4 MG/1
USE AS DIRECTED TAKE 1 TABLET 30 MINUTES PRIOR TO EACH HALF OF COLONOSCOPY PREP TO PREVENT NAUSEA TABLET, FILM COATED ORAL
Refills: 0 | Status: ACTIVE | COMMUNITY
Start: 2022-01-17

## 2022-09-12 RX ORDER — DEXTROMETHORPHAN POLISTIREX 30 MG/5 ML
SUSPENSION, EXTENDED RELEASE 12 HR ORAL ONCE A DAY
Refills: 2 | Status: ACTIVE | COMMUNITY
Start: 2018-12-12

## 2022-09-12 RX ORDER — PREDNISONE 5 MG/1
TABLET ORAL ONCE A DAY
Refills: 0 | Status: ACTIVE | COMMUNITY
Start: 2022-01-17

## 2022-09-12 RX ORDER — MYCOPHENOLATE MOFETIL 500 MG/1
TABLET, FILM COATED ORAL TWICE A DAY
Refills: 0 | Status: ACTIVE | COMMUNITY
Start: 2022-01-17

## 2022-09-12 RX ORDER — CHOLECALCIFEROL (VITAMIN D3) 1250 MCG
CAPSULE ORAL ONCE A DAY
Refills: 0 | Status: ACTIVE | COMMUNITY
Start: 2022-01-17

## 2022-09-12 RX ORDER — ACYCLOVIR 200 MG/1
CAPSULE ORAL ONCE A DAY
Refills: 2 | Status: ACTIVE | COMMUNITY
Start: 2018-12-12

## 2022-09-12 RX ORDER — MONTELUKAST 10 MG/1
TABLET, FILM COATED ORAL ONCE A DAY
Refills: 0 | Status: ACTIVE | COMMUNITY
Start: 2022-01-17

## 2022-09-12 RX ORDER — ALPRAZOLAM 0.5 MG
TABLET ORAL AS NEEDED
Refills: 0 | Status: ACTIVE | COMMUNITY
Start: 2022-01-17

## 2022-09-12 RX ORDER — POTASSIUM CHLORIDE 1500 MG/1
40 MG TABLET, FILM COATED, EXTENDED RELEASE ORAL ONCE A DAY
Refills: 0 | Status: ACTIVE | COMMUNITY
Start: 2022-01-17

## 2022-09-12 RX ORDER — PANTOPRAZOLE SODIUM 40 MG/1
TABLET, DELAYED RELEASE ORAL ONCE A DAY
Refills: 0 | Status: ACTIVE | COMMUNITY
Start: 2022-01-17

## 2022-09-12 RX ORDER — VEDOLIZUMAB 300 MG/5ML
EVERY 8 WEEKS INJECTION, POWDER, LYOPHILIZED, FOR SOLUTION INTRAVENOUS
Refills: 0 | Status: ACTIVE | COMMUNITY
Start: 2022-01-17

## 2022-09-12 RX ORDER — BISMUTH SUBSALICYLATE 525 MG/1
TABLET ORAL TWICE A DAY
Refills: 0 | Status: ACTIVE | COMMUNITY
Start: 2022-01-17

## 2022-09-12 RX ORDER — GLUCOSAMINE/MSM/CHONDROIT SULF 500-166.6
TABLET ORAL TWICE A DAY
Refills: 2 | Status: ACTIVE | COMMUNITY
Start: 2022-01-17

## 2022-09-12 RX ORDER — ESTRADIOL AND NORETHINDRONE ACETATE .5; .1 MG/1; MG/1
TABLET, FILM COATED ORAL ONCE A DAY
Refills: 0 | Status: ACTIVE | COMMUNITY
Start: 2022-01-17

## 2022-09-12 RX ORDER — FERROUS SULFATE 325(65) MG
TABLET ORAL ONCE A DAY
Refills: 2 | Status: ACTIVE | COMMUNITY
Start: 2019-06-04

## 2022-09-12 RX ORDER — TACROLIMUS 0.5 MG/1
CAPSULE ORAL TWICE A DAY
Refills: 0 | Status: ACTIVE | COMMUNITY
Start: 2021-10-19

## 2022-09-15 PROBLEM — 235746007 INDETERMINATE COLITIS: Status: ACTIVE | Noted: 2022-09-15

## 2022-09-15 PROBLEM — 442159003 CHRONIC ULCERATIVE PANCOLITIS: Status: ACTIVE | Noted: 2022-09-15

## 2022-09-15 PROBLEM — 398050005 DIVERTICULAR DISEASE OF COLON: Status: ACTIVE | Noted: 2022-09-15

## 2022-09-23 ENCOUNTER — OFFICE VISIT (OUTPATIENT)
Dept: URBAN - METROPOLITAN AREA CLINIC 63 | Facility: CLINIC | Age: 57
End: 2022-09-23
Payer: COMMERCIAL

## 2022-09-23 VITALS
WEIGHT: 116 LBS | HEART RATE: 57 BPM | TEMPERATURE: 97.6 F | SYSTOLIC BLOOD PRESSURE: 120 MMHG | BODY MASS INDEX: 20.55 KG/M2 | DIASTOLIC BLOOD PRESSURE: 80 MMHG | HEIGHT: 63 IN | OXYGEN SATURATION: 97 %

## 2022-09-23 DIAGNOSIS — Z94.4 LIVER TRANSPLANT RECIPIENT: ICD-10-CM

## 2022-09-23 DIAGNOSIS — K86.2 PANCREAS CYST: ICD-10-CM

## 2022-09-23 DIAGNOSIS — K51.00 ULCERATIVE PANCOLITIS WITHOUT COMPLICATION: ICD-10-CM

## 2022-09-23 DIAGNOSIS — Z94.0 KIDNEY TRANSPLANT RECIPIENT: ICD-10-CM

## 2022-09-23 DIAGNOSIS — K86.81 EXOCRINE PANCREATIC INSUFFICIENCY: ICD-10-CM

## 2022-09-23 PROBLEM — 47367009: Status: ACTIVE | Noted: 2022-09-23

## 2022-09-23 PROBLEM — 161671001: Status: ACTIVE | Noted: 2022-09-23

## 2022-09-23 PROBLEM — 31258000: Status: ACTIVE | Noted: 2022-09-23

## 2022-09-23 PROBLEM — 444548001: Status: ACTIVE | Noted: 2022-09-23

## 2022-09-23 PROCEDURE — 99214 OFFICE O/P EST MOD 30 MIN: CPT | Performed by: NURSE PRACTITIONER

## 2022-09-23 RX ORDER — POTASSIUM CHLORIDE 1500 MG/1
40 MG TABLET, FILM COATED, EXTENDED RELEASE ORAL ONCE A DAY
Refills: 0 | Status: ACTIVE | COMMUNITY
Start: 2022-01-17

## 2022-09-23 RX ORDER — PREDNISONE 5 MG/1
TABLET ORAL ONCE A DAY
Refills: 0 | Status: ACTIVE | COMMUNITY
Start: 2022-01-17

## 2022-09-23 RX ORDER — BISMUTH SUBSALICYLATE 525 MG/1
TABLET ORAL TWICE A DAY
Refills: 0 | COMMUNITY
Start: 2022-01-17

## 2022-09-23 RX ORDER — PANTOPRAZOLE SODIUM 40 MG/1
TABLET, DELAYED RELEASE ORAL ONCE A DAY
Refills: 0 | Status: ACTIVE | COMMUNITY
Start: 2022-01-17

## 2022-09-23 RX ORDER — MYCOPHENOLATE MOFETIL 500 MG/1
TABLET, FILM COATED ORAL TWICE A DAY
Refills: 0 | Status: ACTIVE | COMMUNITY
Start: 2022-01-17

## 2022-09-23 RX ORDER — GLUCOSAMINE/MSM/CHONDROIT SULF 500-166.6
TABLET ORAL TWICE A DAY
Refills: 2 | Status: ACTIVE | COMMUNITY
Start: 2022-01-17

## 2022-09-23 RX ORDER — FERROUS SULFATE 325(65) MG
TABLET ORAL ONCE A DAY
Refills: 2 | Status: ACTIVE | COMMUNITY
Start: 2019-06-04

## 2022-09-23 RX ORDER — GABAPENTIN 300 MG/1
CAPSULE ORAL ONCE A DAY
Refills: 0 | Status: ACTIVE | COMMUNITY
Start: 2022-01-17

## 2022-09-23 RX ORDER — LISINOPRIL 20 MG/1
TABLET ORAL ONCE A DAY
Refills: 0 | Status: ACTIVE | COMMUNITY
Start: 2022-01-17

## 2022-09-23 RX ORDER — PANCRELIPASE 36000; 180000; 114000 [USP'U]/1; [USP'U]/1; [USP'U]/1
TAKE 3 CAPSULES WITH EACH MEAL AND 1 WITH A SNACK CAPSULE, DELAYED RELEASE PELLETS ORAL
Refills: 3 | Status: ACTIVE | COMMUNITY
Start: 2019-12-16

## 2022-09-23 RX ORDER — ALPRAZOLAM 0.5 MG
TABLET ORAL AS NEEDED
Refills: 0 | Status: ACTIVE | COMMUNITY
Start: 2022-01-17

## 2022-09-23 RX ORDER — FEXOFENADINE HYDROCHLORIDE 180 MG/1
1 TABLET SWALLOW WHOLE WITH WATER; DO NOT TAKE WITH FRUIT JUICES TABLET ORAL ONCE A DAY
Status: ACTIVE | COMMUNITY

## 2022-09-23 RX ORDER — PANCRELIPASE 36000; 180000; 114000 [USP'U]/1; [USP'U]/1; [USP'U]/1
AS DIRECTED CAPSULE, DELAYED RELEASE PELLETS ORAL
Qty: 750 | Refills: 3 | OUTPATIENT
Start: 2022-09-23 | End: 2023-09-18

## 2022-09-23 RX ORDER — VEDOLIZUMAB 300 MG/5ML
EVERY 8 WEEKS INJECTION, POWDER, LYOPHILIZED, FOR SOLUTION INTRAVENOUS
Refills: 0 | Status: ACTIVE | COMMUNITY
Start: 2022-01-17

## 2022-09-23 RX ORDER — ACYCLOVIR 200 MG/1
CAPSULE ORAL ONCE A DAY
Refills: 2 | Status: ACTIVE | COMMUNITY
Start: 2018-12-12

## 2022-09-23 RX ORDER — ESTRADIOL AND NORETHINDRONE ACETATE .5; .1 MG/1; MG/1
TABLET, FILM COATED ORAL ONCE A DAY
Refills: 0 | Status: ACTIVE | COMMUNITY
Start: 2022-01-17

## 2022-09-23 RX ORDER — TACROLIMUS 1 MG/1
CAPSULE ORAL
Refills: 0 | Status: ACTIVE | COMMUNITY
Start: 2018-12-12

## 2022-09-23 RX ORDER — DEXTROMETHORPHAN POLISTIREX 30 MG/5 ML
SUSPENSION, EXTENDED RELEASE 12 HR ORAL ONCE A DAY
Refills: 2 | COMMUNITY
Start: 2018-12-12

## 2022-09-23 RX ORDER — MONTELUKAST 10 MG/1
TABLET, FILM COATED ORAL ONCE A DAY
Refills: 0 | Status: ACTIVE | COMMUNITY
Start: 2022-01-17

## 2022-09-23 RX ORDER — MYCOPHENOLATE MOFETIL 500 MG/1
TABLET, FILM COATED ORAL TWICE A DAY
Refills: 0 | COMMUNITY
Start: 2022-01-17

## 2022-09-23 NOTE — HPI-LIVER TRANSPLANT FOLLOW-UP
Liver transplant 30 years ago secondary to PSC. Following with [facility] transplant annually. Gets labs and they follow Tac levels every 2 months. Also is a kidney transplant recipient

## 2022-09-23 NOTE — HPI-HPI
Having 3-4 bm's per day with more form since starting entyvio. Previously having 7-8/day. Taking creon 3 with meals. Also sees Dr Flores at [facility] . Clinically doing great.

## 2022-09-23 NOTE — HPI-ULCERATIVE COLITIS
Last Colonoscopy:  2022 Quiescent colitis only on exam Last colonoscopy with us  showed mild pancolitis, no adenomas.  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

## 2023-01-23 ENCOUNTER — NEW PATIENT (OUTPATIENT)
Dept: URBAN - METROPOLITAN AREA CLINIC 26 | Facility: CLINIC | Age: 58
End: 2023-01-23

## 2023-01-23 VITALS
WEIGHT: 116 LBS | SYSTOLIC BLOOD PRESSURE: 127 MMHG | HEIGHT: 63 IN | BODY MASS INDEX: 20.55 KG/M2 | HEART RATE: 65 BPM | DIASTOLIC BLOOD PRESSURE: 87 MMHG

## 2023-01-23 DIAGNOSIS — H35.373: ICD-10-CM

## 2023-01-23 DIAGNOSIS — H04.123: ICD-10-CM

## 2023-01-23 DIAGNOSIS — H35.352: ICD-10-CM

## 2023-01-23 PROCEDURE — 99204 OFFICE O/P NEW MOD 45 MIN: CPT

## 2023-01-23 PROCEDURE — 92250 FUNDUS PHOTOGRAPHY W/I&R: CPT

## 2023-01-23 PROCEDURE — 92134 CPTRZ OPH DX IMG PST SGM RTA: CPT

## 2023-01-23 RX ORDER — KETOROLAC TROMETHAMINE 5 MG/ML: 1 SOLUTION OPHTHALMIC

## 2023-01-23 RX ORDER — PREDNISOLONE ACETATE 10 MG/ML: 1 SUSPENSION/ DROPS OPHTHALMIC

## 2023-01-23 ASSESSMENT — VISUAL ACUITY
OD_SC: 20/60-2
OD_CC: 20/20-2
OS_SC: 20/60-2
OS_CC: 20/40

## 2023-01-23 ASSESSMENT — TONOMETRY
OD_IOP_MMHG: 12
OS_IOP_MMHG: 13

## 2023-04-10 ENCOUNTER — COMPREHENSIVE EXAM (OUTPATIENT)
Dept: URBAN - METROPOLITAN AREA CLINIC 26 | Facility: CLINIC | Age: 58
End: 2023-04-10

## 2023-04-10 VITALS — HEIGHT: 63 IN | WEIGHT: 115 LBS | BODY MASS INDEX: 20.37 KG/M2

## 2023-04-10 DIAGNOSIS — H04.123: ICD-10-CM

## 2023-04-10 DIAGNOSIS — H35.352: ICD-10-CM

## 2023-04-10 DIAGNOSIS — H35.373: ICD-10-CM

## 2023-04-10 PROCEDURE — 92250 FUNDUS PHOTOGRAPHY W/I&R: CPT

## 2023-04-10 PROCEDURE — 92012 INTRM OPH EXAM EST PATIENT: CPT

## 2023-04-10 PROCEDURE — 92134 CPTRZ OPH DX IMG PST SGM RTA: CPT

## 2023-04-10 RX ORDER — PREDNISOLONE ACETATE 10 MG/ML: 1 SUSPENSION/ DROPS OPHTHALMIC

## 2023-04-10 ASSESSMENT — VISUAL ACUITY
OS_CC: 20/40-1
OD_CC: 20/25-1

## 2023-04-10 ASSESSMENT — TONOMETRY
OD_IOP_MMHG: 9
OS_IOP_MMHG: 10

## 2023-05-03 ENCOUNTER — TELEPHONE ENCOUNTER (OUTPATIENT)
Dept: URBAN - METROPOLITAN AREA CLINIC 63 | Facility: CLINIC | Age: 58
End: 2023-05-03

## 2023-05-05 ENCOUNTER — TELEPHONE ENCOUNTER (OUTPATIENT)
Dept: URBAN - METROPOLITAN AREA CLINIC 63 | Facility: CLINIC | Age: 58
End: 2023-05-05

## 2023-06-06 ENCOUNTER — OFFICE VISIT (OUTPATIENT)
Dept: URBAN - METROPOLITAN AREA CLINIC 63 | Facility: CLINIC | Age: 58
End: 2023-06-06
Payer: COMMERCIAL

## 2023-06-06 ENCOUNTER — WEB ENCOUNTER (OUTPATIENT)
Dept: URBAN - METROPOLITAN AREA CLINIC 63 | Facility: CLINIC | Age: 58
End: 2023-06-06

## 2023-06-06 VITALS
TEMPERATURE: 98.3 F | WEIGHT: 120 LBS | DIASTOLIC BLOOD PRESSURE: 80 MMHG | OXYGEN SATURATION: 97 % | BODY MASS INDEX: 21.26 KG/M2 | HEART RATE: 60 BPM | HEIGHT: 63 IN | SYSTOLIC BLOOD PRESSURE: 120 MMHG

## 2023-06-06 DIAGNOSIS — Z94.0 KIDNEY TRANSPLANT RECIPIENT: ICD-10-CM

## 2023-06-06 DIAGNOSIS — K83.01 PRIMARY SCLEROSING CHOLANGITIS: ICD-10-CM

## 2023-06-06 DIAGNOSIS — Z94.4 LIVER TRANSPLANT RECIPIENT: ICD-10-CM

## 2023-06-06 DIAGNOSIS — K86.2 PANCREAS CYST: ICD-10-CM

## 2023-06-06 DIAGNOSIS — K86.81 EXOCRINE PANCREATIC INSUFFICIENCY: ICD-10-CM

## 2023-06-06 DIAGNOSIS — K51.00 ULCERATIVE PANCOLITIS WITHOUT COMPLICATION: ICD-10-CM

## 2023-06-06 PROCEDURE — 99214 OFFICE O/P EST MOD 30 MIN: CPT | Performed by: NURSE PRACTITIONER

## 2023-06-06 RX ORDER — ONDANSETRON HYDROCHLORIDE 4 MG/1
1 TABLET TABLET, FILM COATED ORAL
Qty: 2 | Refills: 0 | OUTPATIENT
Start: 2023-06-06

## 2023-06-06 RX ORDER — PANCRELIPASE 36000; 180000; 114000 [USP'U]/1; [USP'U]/1; [USP'U]/1
AS DIRECTED CAPSULE, DELAYED RELEASE PELLETS ORAL
Qty: 750 | Refills: 3 | Status: ACTIVE | COMMUNITY
Start: 2022-09-23 | End: 2023-09-18

## 2023-06-06 RX ORDER — MONTELUKAST 10 MG/1
TABLET, FILM COATED ORAL ONCE A DAY
Refills: 0 | Status: ACTIVE | COMMUNITY
Start: 2022-01-17

## 2023-06-06 RX ORDER — POTASSIUM CHLORIDE 1500 MG/1
40 MG TABLET, FILM COATED, EXTENDED RELEASE ORAL ONCE A DAY
Refills: 0 | Status: ACTIVE | COMMUNITY
Start: 2022-01-17

## 2023-06-06 RX ORDER — DEXTROMETHORPHAN POLISTIREX 30 MG/5 ML
SUSPENSION, EXTENDED RELEASE 12 HR ORAL ONCE A DAY
Refills: 2 | Status: ACTIVE | COMMUNITY
Start: 2018-12-12

## 2023-06-06 RX ORDER — PANCRELIPASE 36000; 180000; 114000 [USP'U]/1; [USP'U]/1; [USP'U]/1
AS DIRECTED CAPSULE, DELAYED RELEASE PELLETS ORAL
Qty: 1000 | Refills: 3 | OUTPATIENT
Start: 2023-06-06 | End: 2024-05-31

## 2023-06-06 RX ORDER — ALPRAZOLAM 0.5 MG
TABLET ORAL AS NEEDED
Refills: 0 | Status: ACTIVE | COMMUNITY
Start: 2022-01-17

## 2023-06-06 RX ORDER — SODIUM PICOSULFATE, MAGNESIUM OXIDE, AND ANHYDROUS CITRIC ACID 12; 3.5; 1 G/175ML; G/175ML; MG/175ML
175 ML THE FIRST DOSE THE EVENING BEFORE AND SECOND DOSE THE MORNING OF COLONOSCOPY LIQUID ORAL ONCE A DAY
Qty: 350 | OUTPATIENT
Start: 2023-06-06 | End: 2023-06-08

## 2023-06-06 RX ORDER — GABAPENTIN 300 MG/1
CAPSULE ORAL ONCE A DAY
Refills: 0 | Status: ACTIVE | COMMUNITY
Start: 2022-01-17

## 2023-06-06 RX ORDER — TACROLIMUS 1 MG/1
CAPSULE ORAL
Refills: 0 | Status: ACTIVE | COMMUNITY
Start: 2018-12-12

## 2023-06-06 RX ORDER — ESTRADIOL AND NORETHINDRONE ACETATE .5; .1 MG/1; MG/1
TABLET, FILM COATED ORAL ONCE A DAY
Refills: 0 | Status: ACTIVE | COMMUNITY
Start: 2022-01-17

## 2023-06-06 RX ORDER — BISMUTH SUBSALICYLATE 525 MG/1
TABLET ORAL TWICE A DAY
Refills: 0 | Status: ACTIVE | COMMUNITY
Start: 2022-01-17

## 2023-06-06 RX ORDER — LISINOPRIL 20 MG/1
TABLET ORAL ONCE A DAY
Refills: 0 | Status: ACTIVE | COMMUNITY
Start: 2022-01-17

## 2023-06-06 RX ORDER — VEDOLIZUMAB 300 MG/5ML
EVERY 8 WEEKS INJECTION, POWDER, LYOPHILIZED, FOR SOLUTION INTRAVENOUS
Refills: 0 | Status: ACTIVE | COMMUNITY
Start: 2022-01-17

## 2023-06-06 RX ORDER — FEXOFENADINE HYDROCHLORIDE 180 MG/1
1 TABLET SWALLOW WHOLE WITH WATER; DO NOT TAKE WITH FRUIT JUICES TABLET ORAL ONCE A DAY
Status: ACTIVE | COMMUNITY

## 2023-06-06 RX ORDER — PREDNISONE 5 MG/1
TABLET ORAL ONCE A DAY
Refills: 0 | Status: ACTIVE | COMMUNITY
Start: 2022-01-17

## 2023-06-06 RX ORDER — MYCOPHENOLATE MOFETIL 500 MG/1
TABLET, FILM COATED ORAL TWICE A DAY
Refills: 0 | Status: ACTIVE | COMMUNITY
Start: 2022-01-17

## 2023-06-06 RX ORDER — PANTOPRAZOLE SODIUM 40 MG/1
TABLET, DELAYED RELEASE ORAL ONCE A DAY
Refills: 0 | Status: ACTIVE | COMMUNITY
Start: 2022-01-17

## 2023-06-06 RX ORDER — ACYCLOVIR 200 MG/1
CAPSULE ORAL ONCE A DAY
Refills: 2 | Status: ACTIVE | COMMUNITY
Start: 2018-12-12

## 2023-06-06 RX ORDER — FERROUS SULFATE 325(65) MG
TABLET ORAL ONCE A DAY
Refills: 2 | Status: ACTIVE | COMMUNITY
Start: 2019-06-04

## 2023-06-06 RX ORDER — GLUCOSAMINE/MSM/CHONDROIT SULF 500-166.6
TABLET ORAL TWICE A DAY
Refills: 2 | Status: ACTIVE | COMMUNITY
Start: 2022-01-17

## 2023-06-06 NOTE — HPI-ULCERATIVE COLITIS
Last Colonoscopy:  2022 Quiescent colitis only on exam Last colonoscopy with us  showed mild pancolitis, no adenomas.  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

## 2023-06-06 NOTE — HPI-LIVER TRANSPLANT FOLLOW-UP
Liver transplant 30 years ago secondary to PSC. Following with [facility] transplant annually. Gets labs and they follow Tac levels every 2 months. Also is a kidney transplant recipient

## 2023-06-06 NOTE — HPI-HPI
Having 2-4 bm's per day with more form since starting entyvio. Previously having 7-8 bm per day. Taking creon 2-3 with meals. Also sees Dr Flores at [facility], last visit 2 weeks ago.   Clinically doing great. Needs annual colonoscopy due to history of PSC

## 2023-06-13 ENCOUNTER — LAB OUTSIDE AN ENCOUNTER (OUTPATIENT)
Dept: URBAN - METROPOLITAN AREA CLINIC 63 | Facility: CLINIC | Age: 58
End: 2023-06-13

## 2023-06-28 ENCOUNTER — FOLLOW UP (OUTPATIENT)
Dept: URBAN - METROPOLITAN AREA CLINIC 26 | Facility: CLINIC | Age: 58
End: 2023-06-28

## 2023-06-28 DIAGNOSIS — H35.352: ICD-10-CM

## 2023-06-28 DIAGNOSIS — H04.123: ICD-10-CM

## 2023-06-28 DIAGNOSIS — H35.373: ICD-10-CM

## 2023-06-28 PROCEDURE — 92134 CPTRZ OPH DX IMG PST SGM RTA: CPT

## 2023-06-28 PROCEDURE — 92250 FUNDUS PHOTOGRAPHY W/I&R: CPT

## 2023-06-28 PROCEDURE — 92014 COMPRE OPH EXAM EST PT 1/>: CPT

## 2023-06-28 ASSESSMENT — TONOMETRY
OS_IOP_MMHG: 11
OD_IOP_MMHG: 11

## 2023-06-28 ASSESSMENT — VISUAL ACUITY
OS_CC: 20/30
OD_CC: 20/25+1

## 2023-08-25 ENCOUNTER — LAB OUTSIDE AN ENCOUNTER (OUTPATIENT)
Dept: URBAN - METROPOLITAN AREA CLINIC 63 | Facility: CLINIC | Age: 58
End: 2023-08-25

## 2023-08-28 ENCOUNTER — TELEPHONE ENCOUNTER (OUTPATIENT)
Dept: URBAN - METROPOLITAN AREA CLINIC 63 | Facility: CLINIC | Age: 58
End: 2023-08-28

## 2023-08-30 ENCOUNTER — FOLLOW UP (OUTPATIENT)
Dept: URBAN - METROPOLITAN AREA CLINIC 26 | Facility: CLINIC | Age: 58
End: 2023-08-30

## 2023-08-30 DIAGNOSIS — H04.123: ICD-10-CM

## 2023-08-30 DIAGNOSIS — H35.352: ICD-10-CM

## 2023-08-30 DIAGNOSIS — H35.373: ICD-10-CM

## 2023-08-30 PROCEDURE — 92250 FUNDUS PHOTOGRAPHY W/I&R: CPT

## 2023-08-30 PROCEDURE — 92134 CPTRZ OPH DX IMG PST SGM RTA: CPT

## 2023-08-30 PROCEDURE — 99214 OFFICE O/P EST MOD 30 MIN: CPT

## 2023-08-30 RX ORDER — TOBRAMYCIN 3 MG/ML: 1 SOLUTION/ DROPS OPHTHALMIC

## 2023-08-30 ASSESSMENT — VISUAL ACUITY
OD_CC: 20/25
OS_CC: 20/30-2

## 2023-08-30 ASSESSMENT — TONOMETRY
OS_IOP_MMHG: 10
OD_IOP_MMHG: 11

## 2023-09-01 ENCOUNTER — TELEPHONE ENCOUNTER (OUTPATIENT)
Dept: URBAN - METROPOLITAN AREA CLINIC 63 | Facility: CLINIC | Age: 58
End: 2023-09-01

## 2023-09-01 LAB
CALPROTECTIN, FECAL: 88
CAMPYLOBACTER SPP. AG,EIA: (no result)
CLOSTRIDIUM DIFFICILE: (no result)
CRYPTOSPORIDIUM ANTIGEN,: (no result)
GIARDIA AG, EIA, STOOL: (no result)
LEUKOCYTES: (no result)
OVA AND PARASITES, CONC AND PERM SMEAR: (no result)
ROTAVIRUS AG, EIA: (no result)
SALMONELLA AND SHIGELLA, CULTURE: (no result)
SHIGA TOXINS, EIA W/RFL TO E.COLI O157 CULTURE: (no result)

## 2023-09-01 RX ORDER — NITAZOXANIDE 500 MG/1
1 TABLET WITH FOOD TABLET, FILM COATED ORAL
Qty: 56 TABLET | Refills: 0 | OUTPATIENT
Start: 2023-09-01 | End: 2023-09-29

## 2023-09-14 ENCOUNTER — SURGERY/PROCEDURE (OUTPATIENT)
Facility: LOCATION | Age: 58
End: 2023-09-14

## 2023-09-14 DIAGNOSIS — H35.373: ICD-10-CM

## 2023-09-14 PROCEDURE — 67041 VIT FOR MACULAR PUCKER: CPT

## 2023-09-15 ENCOUNTER — POST-OP (OUTPATIENT)
Dept: URBAN - METROPOLITAN AREA CLINIC 26 | Facility: CLINIC | Age: 58
End: 2023-09-15

## 2023-09-15 DIAGNOSIS — Z98.890: ICD-10-CM

## 2023-09-15 DIAGNOSIS — H35.373: ICD-10-CM

## 2023-09-15 PROCEDURE — 99024 POSTOP FOLLOW-UP VISIT: CPT

## 2023-09-15 ASSESSMENT — VISUAL ACUITY: OD_CC: 20/20-1

## 2023-09-15 ASSESSMENT — TONOMETRY
OD_IOP_MMHG: 11
OS_IOP_MMHG: 06

## 2023-09-18 ENCOUNTER — OFFICE VISIT (OUTPATIENT)
Dept: URBAN - METROPOLITAN AREA SURGERY CENTER 4 | Facility: SURGERY CENTER | Age: 58
End: 2023-09-18

## 2023-09-18 ENCOUNTER — TELEPHONE ENCOUNTER (OUTPATIENT)
Dept: URBAN - METROPOLITAN AREA CLINIC 63 | Facility: CLINIC | Age: 58
End: 2023-09-18

## 2023-09-18 RX ORDER — NITAZOXANIDE 500 MG/1
1 TABLET WITH FOOD TABLET, FILM COATED ORAL
Qty: 20 TABLET | Refills: 3
Start: 2023-09-01 | End: 2023-10-30

## 2023-09-21 ENCOUNTER — TELEPHONE ENCOUNTER (OUTPATIENT)
Dept: URBAN - METROPOLITAN AREA CLINIC 63 | Facility: CLINIC | Age: 58
End: 2023-09-21

## 2023-09-21 RX ORDER — AZITHROMYCIN MONOHYDRATE 500 MG/1
1 TABLET TABLET, FILM COATED ORAL DAILY
Qty: 21 TABLET | Refills: 0 | OUTPATIENT
Start: 2023-09-25 | End: 2023-10-16

## 2023-09-22 ENCOUNTER — POST-OP (OUTPATIENT)
Dept: URBAN - METROPOLITAN AREA CLINIC 26 | Facility: CLINIC | Age: 58
End: 2023-09-22

## 2023-09-22 DIAGNOSIS — H35.373: ICD-10-CM

## 2023-09-22 DIAGNOSIS — Z98.890: ICD-10-CM

## 2023-09-22 PROCEDURE — 99024 POSTOP FOLLOW-UP VISIT: CPT

## 2023-09-22 ASSESSMENT — TONOMETRY: OS_IOP_MMHG: 21

## 2023-09-22 ASSESSMENT — VISUAL ACUITY: OS_CC: 20/30-1

## 2023-10-02 ENCOUNTER — OFFICE VISIT (OUTPATIENT)
Dept: URBAN - METROPOLITAN AREA CLINIC 63 | Facility: CLINIC | Age: 58
End: 2023-10-02

## 2023-10-13 ENCOUNTER — OFFICE VISIT (OUTPATIENT)
Dept: URBAN - METROPOLITAN AREA CLINIC 63 | Facility: CLINIC | Age: 58
End: 2023-10-13
Payer: COMMERCIAL

## 2023-10-13 VITALS
HEIGHT: 63 IN | WEIGHT: 120 LBS | TEMPERATURE: 97.8 F | BODY MASS INDEX: 21.26 KG/M2 | HEART RATE: 64 BPM | OXYGEN SATURATION: 99 % | DIASTOLIC BLOOD PRESSURE: 80 MMHG | SYSTOLIC BLOOD PRESSURE: 122 MMHG

## 2023-10-13 DIAGNOSIS — K83.01 PRIMARY SCLEROSING CHOLANGITIS: ICD-10-CM

## 2023-10-13 DIAGNOSIS — A07.2 DIARRHEA DUE TO CRYPTOSPORIDIUM: ICD-10-CM

## 2023-10-13 DIAGNOSIS — Z92.25 PERSONAL HISTORY OF IMMUNOSUPPRESSION THERAPY: ICD-10-CM

## 2023-10-13 DIAGNOSIS — R09.89 THROAT CLEARING: ICD-10-CM

## 2023-10-13 DIAGNOSIS — R12 HEARTBURN: ICD-10-CM

## 2023-10-13 DIAGNOSIS — K51.00 ULCERATIVE PANCOLITIS WITHOUT COMPLICATION: ICD-10-CM

## 2023-10-13 PROCEDURE — 99214 OFFICE O/P EST MOD 30 MIN: CPT | Performed by: NURSE PRACTITIONER

## 2023-10-13 RX ORDER — DEXTROMETHORPHAN POLISTIREX 30 MG/5 ML
SUSPENSION, EXTENDED RELEASE 12 HR ORAL ONCE A DAY
Refills: 2 | Status: ACTIVE | COMMUNITY
Start: 2018-12-12

## 2023-10-13 RX ORDER — ACYCLOVIR 200 MG/1
CAPSULE ORAL ONCE A DAY
Refills: 2 | Status: ACTIVE | COMMUNITY
Start: 2018-12-12

## 2023-10-13 RX ORDER — NITAZOXANIDE 500 MG/1
1 TABLET WITH FOOD TABLET, FILM COATED ORAL
Qty: 120 TABLET | Refills: 1 | OUTPATIENT
Start: 2023-10-13 | End: 2024-02-09

## 2023-10-13 RX ORDER — MYCOPHENOLATE MOFETIL 500 MG/1
TABLET, FILM COATED ORAL TWICE A DAY
Refills: 0 | Status: ACTIVE | COMMUNITY
Start: 2022-01-17

## 2023-10-13 RX ORDER — ALPRAZOLAM 0.5 MG
TABLET ORAL AS NEEDED
Refills: 0 | Status: ACTIVE | COMMUNITY
Start: 2022-01-17

## 2023-10-13 RX ORDER — ESTRADIOL AND NORETHINDRONE ACETATE .5; .1 MG/1; MG/1
TABLET, FILM COATED ORAL ONCE A DAY
Refills: 0 | Status: ACTIVE | COMMUNITY
Start: 2022-01-17

## 2023-10-13 RX ORDER — LISINOPRIL 20 MG/1
TABLET ORAL ONCE A DAY
Refills: 0 | Status: ACTIVE | COMMUNITY
Start: 2022-01-17

## 2023-10-13 RX ORDER — TACROLIMUS 1 MG/1
CAPSULE ORAL
Refills: 0 | Status: ACTIVE | COMMUNITY
Start: 2018-12-12

## 2023-10-13 RX ORDER — GLUCOSAMINE/MSM/CHONDROIT SULF 500-166.6
TABLET ORAL TWICE A DAY
Refills: 2 | Status: ACTIVE | COMMUNITY
Start: 2022-01-17

## 2023-10-13 RX ORDER — FERROUS SULFATE 325(65) MG
TABLET ORAL ONCE A DAY
Refills: 2 | Status: ACTIVE | COMMUNITY
Start: 2019-06-04

## 2023-10-13 RX ORDER — BISMUTH SUBSALICYLATE 525 MG/1
TABLET ORAL TWICE A DAY
Refills: 0 | Status: ACTIVE | COMMUNITY
Start: 2022-01-17

## 2023-10-13 RX ORDER — PANCRELIPASE 36000; 180000; 114000 [USP'U]/1; [USP'U]/1; [USP'U]/1
AS DIRECTED CAPSULE, DELAYED RELEASE PELLETS ORAL
Qty: 1000 | Refills: 3 | Status: ACTIVE | COMMUNITY
Start: 2023-06-06 | End: 2024-05-31

## 2023-10-13 RX ORDER — VEDOLIZUMAB 300 MG/5ML
EVERY 8 WEEKS INJECTION, POWDER, LYOPHILIZED, FOR SOLUTION INTRAVENOUS
Refills: 0 | Status: ACTIVE | COMMUNITY
Start: 2022-01-17

## 2023-10-13 RX ORDER — PREDNISONE 5 MG/1
TABLET ORAL ONCE A DAY
Refills: 0 | Status: ACTIVE | COMMUNITY
Start: 2022-01-17

## 2023-10-13 RX ORDER — PANTOPRAZOLE SODIUM 40 MG/1
1 TABLET TABLET, DELAYED RELEASE ORAL TWICE A DAY
Qty: 180 TABLET | Refills: 3 | OUTPATIENT
Start: 2023-10-13

## 2023-10-13 RX ORDER — ONDANSETRON HYDROCHLORIDE 4 MG/1
1 TABLET TABLET, FILM COATED ORAL
Qty: 2 | Refills: 0 | Status: ACTIVE | COMMUNITY
Start: 2023-06-06

## 2023-10-13 RX ORDER — NITAZOXANIDE 500 MG/1
1 TABLET WITH FOOD TABLET, FILM COATED ORAL
Qty: 56 TABLET | Refills: 1 | OUTPATIENT
Start: 2023-10-13 | End: 2023-12-07

## 2023-10-13 RX ORDER — POTASSIUM CHLORIDE 1500 MG/1
40 MG TABLET, FILM COATED, EXTENDED RELEASE ORAL ONCE A DAY
Refills: 0 | Status: ACTIVE | COMMUNITY
Start: 2022-01-17

## 2023-10-13 RX ORDER — PANTOPRAZOLE SODIUM 40 MG/1
TABLET, DELAYED RELEASE ORAL ONCE A DAY
Refills: 0 | Status: ACTIVE | COMMUNITY
Start: 2022-01-17

## 2023-10-13 RX ORDER — AZITHROMYCIN MONOHYDRATE 500 MG/1
1 TABLET TABLET, FILM COATED ORAL DAILY
Qty: 28 TABLET | Refills: 1 | OUTPATIENT
Start: 2023-10-13 | End: 2023-12-07

## 2023-10-13 RX ORDER — GABAPENTIN 300 MG/1
CAPSULE ORAL ONCE A DAY
Refills: 0 | Status: ACTIVE | COMMUNITY
Start: 2022-01-17

## 2023-10-13 RX ORDER — AZITHROMYCIN MONOHYDRATE 500 MG/1
1 TABLET TABLET, FILM COATED ORAL DAILY
Qty: 21 TABLET | Refills: 0 | Status: ACTIVE | COMMUNITY
Start: 2023-09-25 | End: 2023-10-16

## 2023-10-13 NOTE — HPI-ULCERATIVE COLITIS
Last Colonoscopy:  2022 Quiescent colitis only on exam Last colonoscopy with us  showed mild pancolitis, no adenomas.  Current IBD Meds: Entyvio q4 weeks  Prior IBD Meds: mesalamine 4.8g/day  Steroid use/response: on prednisone 5mg/day long term d/t dual organ translant  Most recent imagin2021 CT a/p with oral only essentially normal  Extraintestinal manifestations:   Inflammatory Markers:Last fecal calprotectin low  previously high  when she was having severe diarrhea  Required  biologic testing: will request records  Risk factors for severe disease: young age at diagnosis  IBD surgical history: Liver transplant due to PSC - bowel resection/lysis of adhesions at [facility] in 2018 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

## 2023-10-13 NOTE — HPI-LIVER TRANSPLANT FOLLOW-UP
Liver transplant 30 years ago secondary to PSC. Following with [facility] transplant annually. Gets labs and they follow Tac levels every 2 months. Also is a kidney transplant recipient

## 2023-10-13 NOTE — HPI-HPI
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 I gave her an rx for 500mg nitazoxanide BID x 28 days. Her insurance will only allow her to have 20 tabs every 25 days. We appealled but it was denied. She clinically improved and was back to her baseline by the time she ran out of it. Now back to at least 10 watery bm. She is SEVERELY immunocomprised. She is chronically on  FOUR separate immune suppressing medications that she is not able to hold. She is a dual organ transplant recipient. Severe diarrhea may compromise her kidney function and necessitate another kidney transplant. The patient is a risk of death or loss of transplanted organ if she does not received the medically necessary prescription of nitazoxanide 500mg twice daily for at least 56 days. She will not be able to clear the parasite on her own without antiparasitics.

## 2023-10-24 ENCOUNTER — POST-OP (OUTPATIENT)
Dept: URBAN - METROPOLITAN AREA CLINIC 26 | Facility: CLINIC | Age: 58
End: 2023-10-24

## 2023-10-24 DIAGNOSIS — H02.835: ICD-10-CM

## 2023-10-24 DIAGNOSIS — H04.123: ICD-10-CM

## 2023-10-24 DIAGNOSIS — H35.352: ICD-10-CM

## 2023-10-24 DIAGNOSIS — H35.373: ICD-10-CM

## 2023-10-24 DIAGNOSIS — Z98.890: ICD-10-CM

## 2023-10-24 DIAGNOSIS — H02.832: ICD-10-CM

## 2023-10-24 DIAGNOSIS — Z96.1: ICD-10-CM

## 2023-10-24 PROCEDURE — 92134 CPTRZ OPH DX IMG PST SGM RTA: CPT

## 2023-10-24 PROCEDURE — 99024 POSTOP FOLLOW-UP VISIT: CPT

## 2023-10-24 PROCEDURE — 92250 FUNDUS PHOTOGRAPHY W/I&R: CPT

## 2023-10-24 ASSESSMENT — VISUAL ACUITY
OS_CC: 20/20
OD_CC: 20/30-2

## 2023-10-24 ASSESSMENT — TONOMETRY: OS_IOP_MMHG: 15

## 2023-10-26 ENCOUNTER — WEB ENCOUNTER (OUTPATIENT)
Dept: URBAN - METROPOLITAN AREA CLINIC 63 | Facility: CLINIC | Age: 58
End: 2023-10-26

## 2023-11-06 ENCOUNTER — WEB ENCOUNTER (OUTPATIENT)
Dept: URBAN - METROPOLITAN AREA CLINIC 63 | Facility: CLINIC | Age: 58
End: 2023-11-06

## 2023-11-06 ENCOUNTER — OFFICE VISIT (OUTPATIENT)
Dept: URBAN - METROPOLITAN AREA SURGERY CENTER 4 | Facility: SURGERY CENTER | Age: 58
End: 2023-11-06

## 2023-11-06 RX ORDER — ONDANSETRON HYDROCHLORIDE 4 MG/1
1 TABLET AS NEEDED FOR NAUSEA TABLET, FILM COATED ORAL
Qty: 90 | Refills: 0 | OUTPATIENT
Start: 2023-11-07

## 2023-11-07 ENCOUNTER — WEB ENCOUNTER (OUTPATIENT)
Dept: URBAN - METROPOLITAN AREA CLINIC 63 | Facility: CLINIC | Age: 58
End: 2023-11-07

## 2023-11-28 ENCOUNTER — LAB OUTSIDE AN ENCOUNTER (OUTPATIENT)
Dept: URBAN - METROPOLITAN AREA CLINIC 63 | Facility: CLINIC | Age: 58
End: 2023-11-28

## 2023-11-28 ENCOUNTER — OFFICE VISIT (OUTPATIENT)
Dept: URBAN - METROPOLITAN AREA CLINIC 63 | Facility: CLINIC | Age: 58
End: 2023-11-28
Payer: COMMERCIAL

## 2023-11-28 VITALS — HEIGHT: 63 IN

## 2023-11-28 DIAGNOSIS — K83.01 PRIMARY SCLEROSING CHOLANGITIS: ICD-10-CM

## 2023-11-28 DIAGNOSIS — K51.00 ULCERATIVE PANCOLITIS WITHOUT COMPLICATION: ICD-10-CM

## 2023-11-28 DIAGNOSIS — Z94.4 LIVER TRANSPLANT RECIPIENT: ICD-10-CM

## 2023-11-28 DIAGNOSIS — Z94.0 KIDNEY TRANSPLANT RECIPIENT: ICD-10-CM

## 2023-11-28 PROCEDURE — 99214 OFFICE O/P EST MOD 30 MIN: CPT | Performed by: NURSE PRACTITIONER

## 2023-11-28 RX ORDER — ESTRADIOL AND NORETHINDRONE ACETATE .5; .1 MG/1; MG/1
TABLET, FILM COATED ORAL ONCE A DAY
Refills: 0 | Status: ACTIVE | COMMUNITY
Start: 2022-01-17

## 2023-11-28 RX ORDER — PREDNISONE 5 MG/1
TABLET ORAL ONCE A DAY
Refills: 0 | Status: ACTIVE | COMMUNITY
Start: 2022-01-17

## 2023-11-28 RX ORDER — TACROLIMUS 1 MG/1
CAPSULE ORAL
Refills: 0 | Status: ACTIVE | COMMUNITY
Start: 2018-12-12

## 2023-11-28 RX ORDER — PANTOPRAZOLE SODIUM 40 MG/1
1 TABLET TABLET, DELAYED RELEASE ORAL TWICE A DAY
Qty: 180 TABLET | Refills: 3 | Status: ACTIVE | COMMUNITY
Start: 2023-10-13

## 2023-11-28 RX ORDER — GLUCOSAMINE/MSM/CHONDROIT SULF 500-166.6
TABLET ORAL TWICE A DAY
Refills: 2 | Status: ACTIVE | COMMUNITY
Start: 2022-01-17

## 2023-11-28 RX ORDER — ACYCLOVIR 200 MG/1
CAPSULE ORAL ONCE A DAY
Refills: 2 | Status: ACTIVE | COMMUNITY
Start: 2018-12-12

## 2023-11-28 RX ORDER — ONDANSETRON HYDROCHLORIDE 4 MG/1
1 TABLET AS NEEDED FOR NAUSEA TABLET, FILM COATED ORAL
Qty: 90 | Refills: 0 | Status: ACTIVE | COMMUNITY
Start: 2023-11-07

## 2023-11-28 RX ORDER — NITAZOXANIDE 500 MG/1
1 TABLET WITH FOOD TABLET, FILM COATED ORAL
Qty: 56 TABLET | Refills: 1 | Status: ACTIVE | COMMUNITY
Start: 2023-10-13 | End: 2023-12-07

## 2023-11-28 RX ORDER — BISMUTH SUBSALICYLATE 525 MG/1
TABLET ORAL TWICE A DAY
Refills: 0 | Status: ACTIVE | COMMUNITY
Start: 2022-01-17

## 2023-11-28 RX ORDER — AZITHROMYCIN MONOHYDRATE 500 MG/1
1 TABLET TABLET, FILM COATED ORAL DAILY
Qty: 28 TABLET | Refills: 1 | Status: ACTIVE | COMMUNITY
Start: 2023-10-13 | End: 2023-12-07

## 2023-11-28 RX ORDER — ALPRAZOLAM 0.5 MG
TABLET ORAL AS NEEDED
Refills: 0 | Status: ACTIVE | COMMUNITY
Start: 2022-01-17

## 2023-11-28 RX ORDER — GABAPENTIN 300 MG/1
CAPSULE ORAL ONCE A DAY
Refills: 0 | Status: ACTIVE | COMMUNITY
Start: 2022-01-17

## 2023-11-28 RX ORDER — FERROUS SULFATE 325(65) MG
TABLET ORAL ONCE A DAY
Refills: 2 | Status: ACTIVE | COMMUNITY
Start: 2019-06-04

## 2023-11-28 RX ORDER — POTASSIUM CHLORIDE 1500 MG/1
40 MG TABLET, FILM COATED, EXTENDED RELEASE ORAL ONCE A DAY
Refills: 0 | Status: ACTIVE | COMMUNITY
Start: 2022-01-17

## 2023-11-28 RX ORDER — VEDOLIZUMAB 300 MG/5ML
EVERY 8 WEEKS INJECTION, POWDER, LYOPHILIZED, FOR SOLUTION INTRAVENOUS
Refills: 0 | Status: ACTIVE | COMMUNITY
Start: 2022-01-17

## 2023-11-28 RX ORDER — PANCRELIPASE 36000; 180000; 114000 [USP'U]/1; [USP'U]/1; [USP'U]/1
AS DIRECTED CAPSULE, DELAYED RELEASE PELLETS ORAL
Qty: 1000 | Refills: 3 | Status: ACTIVE | COMMUNITY
Start: 2023-06-06 | End: 2024-05-31

## 2023-11-28 RX ORDER — PANTOPRAZOLE SODIUM 40 MG/1
TABLET, DELAYED RELEASE ORAL ONCE A DAY
Refills: 0 | Status: ACTIVE | COMMUNITY
Start: 2022-01-17

## 2023-11-28 RX ORDER — MYCOPHENOLATE MOFETIL 500 MG/1
TABLET, FILM COATED ORAL TWICE A DAY
Refills: 0 | Status: ACTIVE | COMMUNITY
Start: 2022-01-17

## 2023-11-28 RX ORDER — NITAZOXANIDE 500 MG/1
1 TABLET WITH FOOD TABLET, FILM COATED ORAL
Qty: 120 TABLET | Refills: 1 | Status: ACTIVE | COMMUNITY
Start: 2023-10-13 | End: 2024-02-09

## 2023-11-28 RX ORDER — LISINOPRIL 20 MG/1
TABLET ORAL ONCE A DAY
Refills: 0 | Status: ACTIVE | COMMUNITY
Start: 2022-01-17

## 2023-11-28 RX ORDER — DEXTROMETHORPHAN POLISTIREX 30 MG/5 ML
SUSPENSION, EXTENDED RELEASE 12 HR ORAL ONCE A DAY
Refills: 2 | Status: ACTIVE | COMMUNITY
Start: 2018-12-12

## 2023-11-28 NOTE — HPI-ULCERATIVE COLITIS
Last Colonoscopy:  2022 Quiescent colitis only on exam Last colonoscopy with us  showed mild pancolitis, no adenomas.  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

## 2023-11-28 NOTE — HPI-HPI
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 I gave her an rx for 500mg nitazoxanide BID x 28 days. Her insurance will only allow her to have 20 tabs every 25 days. She was off of nitazoxanide for 2 weeks and then restarted it with a refill form insurance and I gave her a prescription to send to a [facility] pharmacy for a 60 day supply. She has now been on it for at least 60 days uninterrupted, also took azithromycin for rougly 28 days in combination with nitazoxanide.  Now having roughly 4-5 semi formed bm per day. Some abdominal cramps throughout the day. Feels like stools are back to baseline but cramps are new in the past month. Nausea is much improved but still present. No oily stools. Having a lot of bloating and some early satiety. Taking generic align.

## 2023-11-28 NOTE — HPI-LIVER TRANSPLANT FOLLOW-UP
Liver transplant 30 years ago secondary to PSC. Following with [facility] transplant annually. Gets labs and they follow Tac levels every 2 months. Also is a kidney transplant recipient

## 2023-11-28 NOTE — PHYSICAL EXAM GASTROINTESTINAL
Abdomen , soft, nontender, tympanitic to percussion and distended , no guarding or rigidity , no masses palpable , normal bowel sounds no

## 2023-11-30 ENCOUNTER — OFFICE VISIT (OUTPATIENT)
Dept: URBAN - METROPOLITAN AREA CLINIC 60 | Facility: CLINIC | Age: 58
End: 2023-11-30

## 2023-12-05 ENCOUNTER — WEB ENCOUNTER (OUTPATIENT)
Dept: URBAN - METROPOLITAN AREA CLINIC 63 | Facility: CLINIC | Age: 58
End: 2023-12-05

## 2023-12-10 LAB
CAMPYLOBACTER SPP. AG,EIA: (no result)
OVA AND PARASITES, CONC AND PERM SMEAR: (no result)
SALMONELLA AND SHIGELLA, CULTURE: (no result)
SHIGA TOXINS, EIA W/RFL TO E.COLI O157 CULTURE: (no result)

## 2023-12-12 LAB — CRYPTOSPORIDIUM ANTIGEN,: (no result)

## 2024-01-08 ENCOUNTER — OFFICE VISIT (OUTPATIENT)
Dept: URBAN - METROPOLITAN AREA CLINIC 63 | Facility: CLINIC | Age: 59
End: 2024-01-08
Payer: COMMERCIAL

## 2024-01-08 VITALS
OXYGEN SATURATION: 97 % | SYSTOLIC BLOOD PRESSURE: 120 MMHG | TEMPERATURE: 97.5 F | WEIGHT: 123.2 LBS | HEIGHT: 63 IN | BODY MASS INDEX: 21.83 KG/M2 | HEART RATE: 79 BPM | DIASTOLIC BLOOD PRESSURE: 90 MMHG

## 2024-01-08 DIAGNOSIS — K51.00 ULCERATIVE PANCOLITIS WITHOUT COMPLICATION: ICD-10-CM

## 2024-01-08 DIAGNOSIS — K83.01 PRIMARY SCLEROSING CHOLANGITIS: ICD-10-CM

## 2024-01-08 DIAGNOSIS — Z94.0 KIDNEY TRANSPLANT RECIPIENT: ICD-10-CM

## 2024-01-08 DIAGNOSIS — Z92.25 PERSONAL HISTORY OF IMMUNOSUPPRESSION THERAPY: ICD-10-CM

## 2024-01-08 PROCEDURE — 99214 OFFICE O/P EST MOD 30 MIN: CPT | Performed by: NURSE PRACTITIONER

## 2024-01-08 RX ORDER — DEXTROMETHORPHAN POLISTIREX 30 MG/5 ML
SUSPENSION, EXTENDED RELEASE 12 HR ORAL ONCE A DAY
Refills: 2 | Status: ACTIVE | COMMUNITY
Start: 2018-12-12

## 2024-01-08 RX ORDER — ESTRADIOL AND NORETHINDRONE ACETATE .5; .1 MG/1; MG/1
TABLET, FILM COATED ORAL ONCE A DAY
Refills: 0 | Status: ACTIVE | COMMUNITY
Start: 2022-01-17

## 2024-01-08 RX ORDER — TACROLIMUS 1 MG/1
CAPSULE ORAL
Refills: 0 | Status: ACTIVE | COMMUNITY
Start: 2018-12-12

## 2024-01-08 RX ORDER — PANCRELIPASE 36000; 180000; 114000 [USP'U]/1; [USP'U]/1; [USP'U]/1
AS DIRECTED CAPSULE, DELAYED RELEASE PELLETS ORAL
Qty: 1000 | Refills: 3 | Status: ACTIVE | COMMUNITY
Start: 2023-06-06 | End: 2024-05-31

## 2024-01-08 RX ORDER — ONDANSETRON HYDROCHLORIDE 4 MG/1
1 TABLET AS NEEDED FOR NAUSEA TABLET, FILM COATED ORAL
Qty: 90 | Refills: 0 | Status: ACTIVE | COMMUNITY
Start: 2023-11-07

## 2024-01-08 RX ORDER — ALPRAZOLAM 0.5 MG
TABLET ORAL AS NEEDED
Refills: 0 | Status: ACTIVE | COMMUNITY
Start: 2022-01-17

## 2024-01-08 RX ORDER — PANTOPRAZOLE SODIUM 40 MG/1
TABLET, DELAYED RELEASE ORAL ONCE A DAY
Refills: 0 | Status: ACTIVE | COMMUNITY
Start: 2022-01-17

## 2024-01-08 RX ORDER — ACYCLOVIR 200 MG/1
CAPSULE ORAL ONCE A DAY
Refills: 2 | Status: ACTIVE | COMMUNITY
Start: 2018-12-12

## 2024-01-08 RX ORDER — BISMUTH SUBSALICYLATE 525 MG/1
TABLET ORAL TWICE A DAY
Refills: 0 | Status: ACTIVE | COMMUNITY
Start: 2022-01-17

## 2024-01-08 RX ORDER — VEDOLIZUMAB 300 MG/5ML
EVERY 8 WEEKS INJECTION, POWDER, LYOPHILIZED, FOR SOLUTION INTRAVENOUS
Refills: 0 | Status: ACTIVE | COMMUNITY
Start: 2022-01-17

## 2024-01-08 RX ORDER — POTASSIUM CHLORIDE 1500 MG/1
40 MG TABLET, FILM COATED, EXTENDED RELEASE ORAL ONCE A DAY
Refills: 0 | Status: ACTIVE | COMMUNITY
Start: 2022-01-17

## 2024-01-08 RX ORDER — GLUCOSAMINE/MSM/CHONDROIT SULF 500-166.6
TABLET ORAL TWICE A DAY
Refills: 2 | Status: ACTIVE | COMMUNITY
Start: 2022-01-17

## 2024-01-08 RX ORDER — PREDNISONE 5 MG/1
TABLET ORAL ONCE A DAY
Refills: 0 | Status: ACTIVE | COMMUNITY
Start: 2022-01-17

## 2024-01-08 RX ORDER — MYCOPHENOLATE MOFETIL 500 MG/1
TABLET, FILM COATED ORAL TWICE A DAY
Refills: 0 | Status: ACTIVE | COMMUNITY
Start: 2022-01-17

## 2024-01-08 RX ORDER — ONDANSETRON HYDROCHLORIDE 4 MG/1
1 TABLET TABLET, FILM COATED ORAL
Qty: 2 | Refills: 0 | OUTPATIENT
Start: 2024-01-08

## 2024-01-08 RX ORDER — PANTOPRAZOLE SODIUM 40 MG/1
1 TABLET TABLET, DELAYED RELEASE ORAL TWICE A DAY
Qty: 180 TABLET | Refills: 3 | Status: ACTIVE | COMMUNITY
Start: 2023-10-13

## 2024-01-08 RX ORDER — GABAPENTIN 300 MG/1
CAPSULE ORAL ONCE A DAY
Refills: 0 | Status: ACTIVE | COMMUNITY
Start: 2022-01-17

## 2024-01-08 RX ORDER — LISINOPRIL 20 MG/1
TABLET ORAL ONCE A DAY
Refills: 0 | Status: ACTIVE | COMMUNITY
Start: 2022-01-17

## 2024-01-08 RX ORDER — FERROUS SULFATE 325(65) MG
TABLET ORAL ONCE A DAY
Refills: 2 | Status: ACTIVE | COMMUNITY
Start: 2019-06-04

## 2024-01-08 NOTE — HPI-MEDICAL HISTORY:
Very complicated medical history with liver and kidney transplant in roughly 1989 for PSC. Follows with [facility] for this on prograf, mycophenylate and prednisone for anti-rejection.  Started on Entyvio every 4 weeks by Dr Green as 2nd opionion for UC in patient on multiple immune suppressants.  She also has pancreatic cysts and is on Creon for EPI. Sees Dr Flynn for surveillance. Last MRI pancreas 08/2023 with no change per patient. Will request from [facility]

## 2024-01-08 NOTE — HPI-HPI
Diarrhea, bloating, abdominal pain and nausea have resolved. Now off of nitazoxanide for a month after extended course for cryptosporidium which started 09/2023. Also taking physician's choice probiotic and still on entyvio. Feels she is back to her baseline.  Having more heartburn lately, most in the evening. Occurs 4-5 days per week. Takes pantoprazole 40mg in the morning, very rare daytime heartburn.

## 2024-01-08 NOTE — HPI-ULCERATIVE COLITIS
Last Colonoscopy:  2022 Quiescent colitis only on exam Last colonoscopy with us  showed mild pancolitis, no adenomas.  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

## 2024-01-08 NOTE — HPI-LIVER TRANSPLANT FOLLOW-UP
Liver transplant 30 years ago secondary to PSC. Following with [facility] transplant annually. Gets labs and they follow Tac levels every 2 months. Also is a kidney transplant recipient

## 2024-01-09 ENCOUNTER — COMPREHENSIVE EXAM (OUTPATIENT)
Dept: URBAN - METROPOLITAN AREA CLINIC 26 | Facility: CLINIC | Age: 59
End: 2024-01-09

## 2024-01-09 DIAGNOSIS — H35.373: ICD-10-CM

## 2024-01-09 DIAGNOSIS — H04.123: ICD-10-CM

## 2024-01-09 DIAGNOSIS — Z98.890: ICD-10-CM

## 2024-01-09 DIAGNOSIS — H02.835: ICD-10-CM

## 2024-01-09 DIAGNOSIS — H02.832: ICD-10-CM

## 2024-01-09 DIAGNOSIS — Z96.1: ICD-10-CM

## 2024-01-09 DIAGNOSIS — H35.352: ICD-10-CM

## 2024-01-09 PROCEDURE — 99213 OFFICE O/P EST LOW 20 MIN: CPT

## 2024-01-09 PROCEDURE — 92250 FUNDUS PHOTOGRAPHY W/I&R: CPT

## 2024-01-09 PROCEDURE — 92134 CPTRZ OPH DX IMG PST SGM RTA: CPT

## 2024-01-09 ASSESSMENT — VISUAL ACUITY
OD_CC: 20/20-2
OS_CC: 20/20-1

## 2024-01-09 ASSESSMENT — TONOMETRY
OD_IOP_MMHG: 14
OS_IOP_MMHG: 12

## 2024-01-15 ENCOUNTER — LAB OUTSIDE AN ENCOUNTER (OUTPATIENT)
Dept: URBAN - METROPOLITAN AREA CLINIC 63 | Facility: CLINIC | Age: 59
End: 2024-01-15

## 2024-03-18 ENCOUNTER — COL/EGD (OUTPATIENT)
Dept: URBAN - METROPOLITAN AREA SURGERY CENTER 4 | Facility: SURGERY CENTER | Age: 59
End: 2024-03-18
Payer: COMMERCIAL

## 2024-03-18 DIAGNOSIS — K31.89 OTHER DISEASES OF STOMACH AND DUODENUM: ICD-10-CM

## 2024-03-18 DIAGNOSIS — K64.1 SECOND DEGREE HEMORRHOIDS: ICD-10-CM

## 2024-03-18 DIAGNOSIS — K55.20 ANGIODYSPLASIA OF COLON WITHOUT HEMORRHAGE: ICD-10-CM

## 2024-03-18 DIAGNOSIS — K57.30 DIVERTICULOSIS OF LARGE INTESTINE WITHOUT PERFORATION OR ABSCESS WITHOUT BLEEDING: ICD-10-CM

## 2024-03-18 DIAGNOSIS — K63.89 OTHER SPECIFIED DISEASES OF INTESTINE: ICD-10-CM

## 2024-03-18 DIAGNOSIS — K29.50 CHRONIC GASTRITIS WITHOUT BLEEDING, UNSPECIFIED GASTRITIS TYPE: ICD-10-CM

## 2024-03-18 PROCEDURE — 43239 EGD BIOPSY SINGLE/MULTIPLE: CPT | Performed by: INTERNAL MEDICINE

## 2024-03-18 PROCEDURE — 45380 COLONOSCOPY AND BIOPSY: CPT | Performed by: INTERNAL MEDICINE

## 2024-03-18 RX ORDER — FERROUS SULFATE 325(65) MG
TABLET ORAL ONCE A DAY
Refills: 2 | Status: ACTIVE | COMMUNITY
Start: 2019-06-04

## 2024-03-18 RX ORDER — MYCOPHENOLATE MOFETIL 500 MG/1
TABLET, FILM COATED ORAL TWICE A DAY
Refills: 0 | Status: ACTIVE | COMMUNITY
Start: 2022-01-17

## 2024-03-18 RX ORDER — ACYCLOVIR 200 MG/1
CAPSULE ORAL ONCE A DAY
Refills: 2 | Status: ACTIVE | COMMUNITY
Start: 2018-12-12

## 2024-03-18 RX ORDER — ALPRAZOLAM 0.5 MG
TABLET ORAL AS NEEDED
Refills: 0 | Status: ACTIVE | COMMUNITY
Start: 2022-01-17

## 2024-03-18 RX ORDER — PANTOPRAZOLE SODIUM 40 MG/1
1 TABLET TABLET, DELAYED RELEASE ORAL TWICE A DAY
Qty: 180 TABLET | Refills: 3 | Status: ACTIVE | COMMUNITY
Start: 2023-10-13

## 2024-03-18 RX ORDER — ONDANSETRON HYDROCHLORIDE 4 MG/1
1 TABLET AS NEEDED FOR NAUSEA TABLET, FILM COATED ORAL
Qty: 90 | Refills: 0 | Status: ACTIVE | COMMUNITY
Start: 2023-11-07

## 2024-03-18 RX ORDER — LISINOPRIL 20 MG/1
TABLET ORAL ONCE A DAY
Refills: 0 | Status: ACTIVE | COMMUNITY
Start: 2022-01-17

## 2024-03-18 RX ORDER — ONDANSETRON HYDROCHLORIDE 4 MG/1
1 TABLET TABLET, FILM COATED ORAL
Qty: 2 | Refills: 0 | Status: ACTIVE | COMMUNITY
Start: 2024-01-08

## 2024-03-18 RX ORDER — TACROLIMUS 1 MG/1
CAPSULE ORAL
Refills: 0 | Status: ACTIVE | COMMUNITY
Start: 2018-12-12

## 2024-03-18 RX ORDER — PREDNISONE 5 MG/1
TABLET ORAL ONCE A DAY
Refills: 0 | Status: ACTIVE | COMMUNITY
Start: 2022-01-17

## 2024-03-18 RX ORDER — PANTOPRAZOLE SODIUM 40 MG/1
TABLET, DELAYED RELEASE ORAL ONCE A DAY
Refills: 0 | Status: ACTIVE | COMMUNITY
Start: 2022-01-17

## 2024-03-18 RX ORDER — GABAPENTIN 300 MG/1
CAPSULE ORAL ONCE A DAY
Refills: 0 | Status: ACTIVE | COMMUNITY
Start: 2022-01-17

## 2024-03-18 RX ORDER — VEDOLIZUMAB 300 MG/5ML
EVERY 8 WEEKS INJECTION, POWDER, LYOPHILIZED, FOR SOLUTION INTRAVENOUS
Refills: 0 | Status: ACTIVE | COMMUNITY
Start: 2022-01-17

## 2024-03-18 RX ORDER — DEXTROMETHORPHAN POLISTIREX 30 MG/5 ML
SUSPENSION, EXTENDED RELEASE 12 HR ORAL ONCE A DAY
Refills: 2 | Status: ACTIVE | COMMUNITY
Start: 2018-12-12

## 2024-03-18 RX ORDER — GLUCOSAMINE/MSM/CHONDROIT SULF 500-166.6
TABLET ORAL TWICE A DAY
Refills: 2 | Status: ACTIVE | COMMUNITY
Start: 2022-01-17

## 2024-03-18 RX ORDER — BISMUTH SUBSALICYLATE 525 MG/1
TABLET ORAL TWICE A DAY
Refills: 0 | Status: ACTIVE | COMMUNITY
Start: 2022-01-17

## 2024-03-18 RX ORDER — PANCRELIPASE 36000; 180000; 114000 [USP'U]/1; [USP'U]/1; [USP'U]/1
AS DIRECTED CAPSULE, DELAYED RELEASE PELLETS ORAL
Qty: 1000 | Refills: 3 | Status: ACTIVE | COMMUNITY
Start: 2023-06-06 | End: 2024-05-31

## 2024-03-18 RX ORDER — POTASSIUM CHLORIDE 1500 MG/1
40 MG TABLET, FILM COATED, EXTENDED RELEASE ORAL ONCE A DAY
Refills: 0 | Status: ACTIVE | COMMUNITY
Start: 2022-01-17

## 2024-03-18 RX ORDER — ESTRADIOL AND NORETHINDRONE ACETATE .5; .1 MG/1; MG/1
TABLET, FILM COATED ORAL ONCE A DAY
Refills: 0 | Status: ACTIVE | COMMUNITY
Start: 2022-01-17

## 2024-04-11 ENCOUNTER — OV EP (OUTPATIENT)
Dept: URBAN - METROPOLITAN AREA CLINIC 63 | Facility: CLINIC | Age: 59
End: 2024-04-11
Payer: COMMERCIAL

## 2024-04-11 VITALS
SYSTOLIC BLOOD PRESSURE: 117 MMHG | DIASTOLIC BLOOD PRESSURE: 80 MMHG | HEART RATE: 57 BPM | WEIGHT: 120 LBS | RESPIRATION RATE: 20 BRPM | TEMPERATURE: 96.6 F | BODY MASS INDEX: 21.26 KG/M2 | OXYGEN SATURATION: 97 % | HEIGHT: 63 IN

## 2024-04-11 DIAGNOSIS — Z94.0 KIDNEY TRANSPLANT RECIPIENT: ICD-10-CM

## 2024-04-11 DIAGNOSIS — Z92.25 PERSONAL HISTORY OF IMMUNOSUPPRESSION THERAPY: ICD-10-CM

## 2024-04-11 DIAGNOSIS — K83.01 PRIMARY SCLEROSING CHOLANGITIS: ICD-10-CM

## 2024-04-11 DIAGNOSIS — K51.00 ULCERATIVE PANCOLITIS WITHOUT COMPLICATION: ICD-10-CM

## 2024-04-11 PROCEDURE — 99214 OFFICE O/P EST MOD 30 MIN: CPT | Performed by: INTERNAL MEDICINE

## 2024-04-11 RX ORDER — POTASSIUM CHLORIDE 1500 MG/1
40 MG TABLET, FILM COATED, EXTENDED RELEASE ORAL ONCE A DAY
Refills: 0 | Status: ACTIVE | COMMUNITY
Start: 2022-01-17

## 2024-04-11 RX ORDER — ALPRAZOLAM 0.5 MG
TABLET ORAL AS NEEDED
Refills: 0 | Status: ACTIVE | COMMUNITY
Start: 2022-01-17

## 2024-04-11 RX ORDER — TACROLIMUS 1 MG/1
CAPSULE ORAL
Refills: 0 | Status: ACTIVE | COMMUNITY
Start: 2018-12-12

## 2024-04-11 RX ORDER — MYCOPHENOLATE MOFETIL 500 MG/1
TABLET, FILM COATED ORAL TWICE A DAY
Refills: 0 | Status: ACTIVE | COMMUNITY
Start: 2022-01-17

## 2024-04-11 RX ORDER — ACYCLOVIR 200 MG/1
CAPSULE ORAL ONCE A DAY
Refills: 2 | Status: ACTIVE | COMMUNITY
Start: 2018-12-12

## 2024-04-11 RX ORDER — DEXTROMETHORPHAN POLISTIREX 30 MG/5 ML
SUSPENSION, EXTENDED RELEASE 12 HR ORAL ONCE A DAY
Refills: 2 | Status: ACTIVE | COMMUNITY
Start: 2018-12-12

## 2024-04-11 RX ORDER — PANTOPRAZOLE SODIUM 40 MG/1
1 TABLET TABLET, DELAYED RELEASE ORAL TWICE A DAY
Qty: 180 TABLET | Refills: 3 | Status: ACTIVE | COMMUNITY
Start: 2023-10-13

## 2024-04-11 RX ORDER — GABAPENTIN 300 MG/1
CAPSULE ORAL ONCE A DAY
Refills: 0 | Status: ACTIVE | COMMUNITY
Start: 2022-01-17

## 2024-04-11 RX ORDER — PREDNISONE 5 MG/1
TABLET ORAL ONCE A DAY
Refills: 0 | Status: ACTIVE | COMMUNITY
Start: 2022-01-17

## 2024-04-11 RX ORDER — GLUCOSAMINE/MSM/CHONDROIT SULF 500-166.6
TABLET ORAL TWICE A DAY
Refills: 2 | Status: ACTIVE | COMMUNITY
Start: 2022-01-17

## 2024-04-11 RX ORDER — PANCRELIPASE 36000; 180000; 114000 [USP'U]/1; [USP'U]/1; [USP'U]/1
AS DIRECTED CAPSULE, DELAYED RELEASE PELLETS ORAL
Qty: 1000 | Refills: 3 | Status: ACTIVE | COMMUNITY
Start: 2023-06-06 | End: 2024-05-31

## 2024-04-11 RX ORDER — VEDOLIZUMAB 300 MG/5ML
EVERY 8 WEEKS INJECTION, POWDER, LYOPHILIZED, FOR SOLUTION INTRAVENOUS
Refills: 0 | Status: ACTIVE | COMMUNITY
Start: 2022-01-17

## 2024-04-11 RX ORDER — FERROUS SULFATE 325(65) MG
TABLET ORAL ONCE A DAY
Refills: 2 | Status: ACTIVE | COMMUNITY
Start: 2019-06-04

## 2024-04-11 RX ORDER — LISINOPRIL 20 MG/1
TABLET ORAL ONCE A DAY
Refills: 0 | Status: ACTIVE | COMMUNITY
Start: 2022-01-17

## 2024-04-11 RX ORDER — BISMUTH SUBSALICYLATE 525 MG/1
TABLET ORAL TWICE A DAY
Refills: 0 | Status: ACTIVE | COMMUNITY
Start: 2022-01-17

## 2024-04-11 RX ORDER — ESTRADIOL AND NORETHINDRONE ACETATE .5; .1 MG/1; MG/1
TABLET, FILM COATED ORAL ONCE A DAY
Refills: 0 | Status: ACTIVE | COMMUNITY
Start: 2022-01-17

## 2024-04-11 RX ORDER — PANTOPRAZOLE SODIUM 40 MG/1
TABLET, DELAYED RELEASE ORAL ONCE A DAY
Refills: 0 | Status: ACTIVE | COMMUNITY
Start: 2022-01-17

## 2024-04-11 NOTE — HPI-HPI
Shahida is a pleasant 58-year-old female with history of ulcerative colitis complicated by PSC who had undergone a liver transplant several years ago and is now being followed by the [facility] hepatology clinic.  She also had a renal transplant.  She is on immunosuppressant drugs-prednisone 5 mg, tacrolimus 1 mg/day, mycophenolate Moffa till 5 mg oral twice a day.For her ulcerative colitis she is on Stelara q 4 weeks.   She is here for follow-up after her recent upper endoscopy and colonoscopy.   Upper endoscopy was unremarkable-refer to findings below.  She is on pantoprazole for acid reflux.  She denies any dyspepsia, dysphagia, early satiety or bloating or abdominal pain or nausea. Colonoscopy noted mild erythema and congestion in a patchy fashion involving the sigmoid, rectosigmoid, distal descending colon and cecum and ascending colon with relative sparing of the rest of the other parts of the colon.  Pathology in different sections of the colon taken was unremarkable showing no pathology.  No cryptitis no crypt abscesses  Clinically she is doing well without any abdominal cramping diarrhea rectal bleeding tenesmus. She is having her labs monitored at the [facility] hepatology clinic and she sees Dr. Flores every 6 months for her ulcerative colitis She also has pancreatic cysts and is on Creon for EPI. Sees Dr Flynn for surveillance. Last MRI pancreas 08/2023 with no change per patient. Will request from [facility]

## 2024-04-11 NOTE — HPI-ULCERATIVE COLITIS
Last Colonoscopy: 2024 Quiescent colitis  colonoscopy   showed mild pancolitis, no adenomas.  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

## 2024-04-11 NOTE — HPI-PREVIOUS PROCEDURES
Upper endoscopy March 2024:Normal esophagus, mild erythema in the antrum previously biopsied to be H. pylori negative, widely patent pylorus, normal duodenum-no specimens collected  Colonoscopy March 2024:Procedure performed without difficulty, bowel prep adequate to identify polyps, shallow intubation of the TI achievedPatchy area of mildly congested, erythematous and inflamed mucosa was found in the rectosigmoid and in the sigmoid colon, descending colon ascending colon and cecum-pathology in all the segments was unremarkable  Farmer score of 1.  Findings have not changed since her last colonoscopy.  Small angiectasia was found in the hepatic flexure, sigmoid diverticulosis, grade 2 hemorrhoids with decreased anal sphincter tone.

## 2024-07-11 ENCOUNTER — COMPREHENSIVE EXAM (OUTPATIENT)
Dept: URBAN - METROPOLITAN AREA CLINIC 26 | Facility: CLINIC | Age: 59
End: 2024-07-11

## 2024-07-11 VITALS — BODY MASS INDEX: 21.26 KG/M2 | WEIGHT: 120 LBS | HEIGHT: 63 IN

## 2024-07-11 DIAGNOSIS — H04.123: ICD-10-CM

## 2024-07-11 DIAGNOSIS — H35.353: ICD-10-CM

## 2024-07-11 DIAGNOSIS — H02.835: ICD-10-CM

## 2024-07-11 DIAGNOSIS — H02.832: ICD-10-CM

## 2024-07-11 DIAGNOSIS — Z98.890: ICD-10-CM

## 2024-07-11 DIAGNOSIS — Z96.1: ICD-10-CM

## 2024-07-11 DIAGNOSIS — H35.373: ICD-10-CM

## 2024-07-11 PROCEDURE — 92134 CPTRZ OPH DX IMG PST SGM RTA: CPT

## 2024-07-11 PROCEDURE — 92250 FUNDUS PHOTOGRAPHY W/I&R: CPT

## 2024-07-11 PROCEDURE — 92014 COMPRE OPH EXAM EST PT 1/>: CPT

## 2024-07-11 RX ORDER — KETOROLAC TROMETHAMINE 5 MG/ML: 1 SOLUTION OPHTHALMIC TWICE A DAY

## 2024-07-11 ASSESSMENT — TONOMETRY
OD_IOP_MMHG: 10
OS_IOP_MMHG: 11

## 2024-07-11 ASSESSMENT — VISUAL ACUITY
OD_CC: 20/20
OS_CC: 20/40+1

## 2024-09-05 ENCOUNTER — COMPREHENSIVE EXAM (OUTPATIENT)
Dept: URBAN - METROPOLITAN AREA CLINIC 26 | Facility: CLINIC | Age: 59
End: 2024-09-05

## 2024-09-05 DIAGNOSIS — H02.832: ICD-10-CM

## 2024-09-05 DIAGNOSIS — H35.373: ICD-10-CM

## 2024-09-05 DIAGNOSIS — H35.353: ICD-10-CM

## 2024-09-05 DIAGNOSIS — H02.835: ICD-10-CM

## 2024-09-05 DIAGNOSIS — Z96.1: ICD-10-CM

## 2024-09-05 DIAGNOSIS — Z98.890: ICD-10-CM

## 2024-09-05 DIAGNOSIS — H04.123: ICD-10-CM

## 2024-09-05 PROCEDURE — 92134 CPTRZ OPH DX IMG PST SGM RTA: CPT

## 2024-09-05 PROCEDURE — 92250 FUNDUS PHOTOGRAPHY W/I&R: CPT | Mod: 59

## 2024-09-05 PROCEDURE — 99213 OFFICE O/P EST LOW 20 MIN: CPT

## 2024-11-05 ENCOUNTER — WEB ENCOUNTER (OUTPATIENT)
Dept: URBAN - METROPOLITAN AREA CLINIC 63 | Facility: CLINIC | Age: 59
End: 2024-11-05

## 2024-11-05 RX ORDER — PANCRELIPASE 36000; 180000; 114000 [USP'U]/1; [USP'U]/1; [USP'U]/1
AS DIRECTED CAPSULE, DELAYED RELEASE PELLETS ORAL
Qty: 1000 | Refills: 3
Start: 2023-06-06 | End: 2025-10-31

## 2025-05-01 ENCOUNTER — FOLLOW UP (OUTPATIENT)
Age: 60
End: 2025-05-01

## 2025-05-01 VITALS — WEIGHT: 118 LBS | BODY MASS INDEX: 20.91 KG/M2 | HEIGHT: 63 IN

## 2025-05-01 DIAGNOSIS — H35.353: ICD-10-CM

## 2025-05-01 DIAGNOSIS — H02.832: ICD-10-CM

## 2025-05-01 DIAGNOSIS — Z98.890: ICD-10-CM

## 2025-05-01 DIAGNOSIS — H35.373: ICD-10-CM

## 2025-05-01 DIAGNOSIS — H04.123: ICD-10-CM

## 2025-05-01 DIAGNOSIS — H02.835: ICD-10-CM

## 2025-05-01 DIAGNOSIS — Z96.1: ICD-10-CM

## 2025-05-01 PROCEDURE — 92250 FUNDUS PHOTOGRAPHY W/I&R: CPT | Mod: 59

## 2025-05-01 PROCEDURE — 92014 COMPRE OPH EXAM EST PT 1/>: CPT

## 2025-05-01 PROCEDURE — 92134 CPTRZ OPH DX IMG PST SGM RTA: CPT

## 2025-06-26 ENCOUNTER — FOLLOW UP (OUTPATIENT)
Age: 60
End: 2025-06-26

## 2025-06-26 DIAGNOSIS — H04.123: ICD-10-CM

## 2025-06-26 DIAGNOSIS — Z98.890: ICD-10-CM

## 2025-06-26 DIAGNOSIS — H02.832: ICD-10-CM

## 2025-06-26 DIAGNOSIS — Z96.1: ICD-10-CM

## 2025-06-26 DIAGNOSIS — H02.835: ICD-10-CM

## 2025-06-26 DIAGNOSIS — H35.373: ICD-10-CM

## 2025-06-26 DIAGNOSIS — H35.353: ICD-10-CM

## 2025-06-26 PROCEDURE — 92134 CPTRZ OPH DX IMG PST SGM RTA: CPT

## 2025-06-26 PROCEDURE — 99213 OFFICE O/P EST LOW 20 MIN: CPT

## 2025-06-26 PROCEDURE — 92250 FUNDUS PHOTOGRAPHY W/I&R: CPT

## 2025-07-09 ENCOUNTER — WEB ENCOUNTER (OUTPATIENT)
Dept: URBAN - METROPOLITAN AREA CLINIC 63 | Facility: CLINIC | Age: 60
End: 2025-07-09

## 2025-07-09 RX ORDER — PANCRELIPASE 36000; 180000; 114000 [USP'U]/1; [USP'U]/1; [USP'U]/1
AS DIRECTED CAPSULE, DELAYED RELEASE PELLETS ORAL
Qty: 1000 | Refills: 3
Start: 2023-06-06

## 2025-07-16 ENCOUNTER — WEB ENCOUNTER (OUTPATIENT)
Dept: URBAN - METROPOLITAN AREA CLINIC 63 | Facility: CLINIC | Age: 60
End: 2025-07-16

## 2025-07-16 ENCOUNTER — TELEPHONE ENCOUNTER (OUTPATIENT)
Dept: URBAN - METROPOLITAN AREA CLINIC 63 | Facility: CLINIC | Age: 60
End: 2025-07-16

## 2025-07-16 RX ORDER — PANCRELIPASE 36000; 180000; 114000 [USP'U]/1; [USP'U]/1; [USP'U]/1
2 CAPSULES WITH MEALS AND 1 TO 2 CAPSULE WITH SNACKS UP TO 6 CAPSULES PER DAY CAPSULE, DELAYED RELEASE PELLETS ORAL
Qty: 180 | Refills: 5
Start: 2023-06-06

## 2025-07-17 ENCOUNTER — TELEPHONE ENCOUNTER (OUTPATIENT)
Dept: URBAN - METROPOLITAN AREA CLINIC 63 | Facility: CLINIC | Age: 60
End: 2025-07-17

## 2025-07-17 RX ORDER — PANCRELIPASE 36000; 180000; 114000 [USP'U]/1; [USP'U]/1; [USP'U]/1
AS DIRECTED CAPSULE, DELAYED RELEASE PELLETS ORAL
Start: 2025-07-17

## 2025-07-18 ENCOUNTER — OFFICE VISIT (OUTPATIENT)
Dept: URBAN - METROPOLITAN AREA CLINIC 63 | Facility: CLINIC | Age: 60
End: 2025-07-18
Payer: COMMERCIAL

## 2025-07-18 ENCOUNTER — DASHBOARD ENCOUNTERS (OUTPATIENT)
Age: 60
End: 2025-07-18

## 2025-07-18 DIAGNOSIS — K51.00 ULCERATIVE PANCOLITIS WITHOUT COMPLICATION: ICD-10-CM

## 2025-07-18 DIAGNOSIS — Z94.4 LIVER TRANSPLANT RECIPIENT: ICD-10-CM

## 2025-07-18 DIAGNOSIS — Z94.0 KIDNEY TRANSPLANT RECIPIENT: ICD-10-CM

## 2025-07-18 DIAGNOSIS — K86.81 EXOCRINE PANCREATIC INSUFFICIENCY: ICD-10-CM

## 2025-07-18 DIAGNOSIS — Z92.25 PERSONAL HISTORY OF IMMUNOSUPPRESSION THERAPY: ICD-10-CM

## 2025-07-18 DIAGNOSIS — R12 HEARTBURN: ICD-10-CM

## 2025-07-18 DIAGNOSIS — K83.01 PRIMARY SCLEROSING CHOLANGITIS: ICD-10-CM

## 2025-07-18 PROCEDURE — 99214 OFFICE O/P EST MOD 30 MIN: CPT | Performed by: INTERNAL MEDICINE

## 2025-07-18 RX ORDER — TACROLIMUS 1 MG/1
CAPSULE ORAL
Refills: 0 | Status: ACTIVE | COMMUNITY
Start: 2018-12-12

## 2025-07-18 RX ORDER — PANTOPRAZOLE SODIUM 40 MG/1
1 TABLET TABLET, DELAYED RELEASE ORAL TWICE A DAY
Qty: 180 TABLET | Refills: 3 | Status: ACTIVE | COMMUNITY
Start: 2023-10-13

## 2025-07-18 RX ORDER — ESTRADIOL AND NORETHINDRONE ACETATE .5; .1 MG/1; MG/1
TABLET, FILM COATED ORAL ONCE A DAY
Refills: 0 | Status: ACTIVE | COMMUNITY
Start: 2022-01-17

## 2025-07-18 RX ORDER — VEDOLIZUMAB 300 MG/5ML
EVERY 8 WEEKS INJECTION, POWDER, LYOPHILIZED, FOR SOLUTION INTRAVENOUS
Refills: 0 | Status: ACTIVE | COMMUNITY
Start: 2022-01-17

## 2025-07-18 RX ORDER — MYCOPHENOLATE MOFETIL 500 MG/1
TABLET ORAL TWICE A DAY
Refills: 0 | Status: ACTIVE | COMMUNITY
Start: 2022-01-17

## 2025-07-18 RX ORDER — FERROUS SULFATE 325(65) MG
TABLET ORAL ONCE A DAY
Refills: 2 | Status: ACTIVE | COMMUNITY
Start: 2019-06-04

## 2025-07-18 RX ORDER — POTASSIUM CHLORIDE 1500 MG/1
40 MG TABLET, FILM COATED, EXTENDED RELEASE ORAL ONCE A DAY
Refills: 0 | Status: ACTIVE | COMMUNITY
Start: 2022-01-17

## 2025-07-18 RX ORDER — GLUCOSAMINE/MSM/CHONDROIT SULF 500-166.6
TABLET ORAL TWICE A DAY
Refills: 2 | Status: ACTIVE | COMMUNITY
Start: 2022-01-17

## 2025-07-18 RX ORDER — ALPRAZOLAM 0.5 MG
TABLET ORAL AS NEEDED
Refills: 0 | Status: ACTIVE | COMMUNITY
Start: 2022-01-17

## 2025-07-18 RX ORDER — LISINOPRIL 20 MG/1
TABLET ORAL ONCE A DAY
Refills: 0 | Status: ACTIVE | COMMUNITY
Start: 2022-01-17

## 2025-07-18 RX ORDER — BISMUTH SUBSALICYLATE 525 MG/1
TABLET ORAL TWICE A DAY
Refills: 0 | Status: ACTIVE | COMMUNITY
Start: 2022-01-17

## 2025-07-18 RX ORDER — PANCRELIPASE 36000; 180000; 114000 [USP'U]/1; [USP'U]/1; [USP'U]/1
AS DIRECTED CAPSULE, DELAYED RELEASE PELLETS ORAL
Start: 2025-07-18

## 2025-07-18 RX ORDER — PANCRELIPASE 36000; 180000; 114000 [USP'U]/1; [USP'U]/1; [USP'U]/1
AS DIRECTED CAPSULE, DELAYED RELEASE PELLETS ORAL
Status: ACTIVE | COMMUNITY
Start: 2025-07-17

## 2025-07-18 RX ORDER — ACYCLOVIR 200 MG/1
CAPSULE ORAL ONCE A DAY
Refills: 2 | Status: ACTIVE | COMMUNITY
Start: 2018-12-12

## 2025-07-18 RX ORDER — PREDNISONE 5 MG/1
TABLET ORAL ONCE A DAY
Refills: 0 | Status: ACTIVE | COMMUNITY
Start: 2022-01-17

## 2025-07-18 RX ORDER — GABAPENTIN 300 MG/1
CAPSULE ORAL ONCE A DAY
Refills: 0 | Status: ACTIVE | COMMUNITY
Start: 2022-01-17

## 2025-07-18 RX ORDER — FAMOTIDINE 40 MG/1
1 TABLET TABLET, FILM COATED ORAL ONCE A DAY
Qty: 30 | Refills: 5 | OUTPATIENT
Start: 2025-07-18

## 2025-07-18 RX ORDER — TACROLIMUS 0.5 MG/1
AS DIRECTED CAPSULE ORAL
Status: ACTIVE | COMMUNITY

## 2025-07-18 RX ORDER — DEXTROMETHORPHAN POLISTIREX 30 MG/5 ML
SUSPENSION, EXTENDED RELEASE 12 HR ORAL ONCE A DAY
Refills: 2 | Status: ACTIVE | COMMUNITY
Start: 2018-12-12

## 2025-07-18 NOTE — HPI-ULCERATIVE COLITIS
Last Colonoscopy: 2024 Quiescent colitis  colonoscopy   showed mild pancolitis, no adenomas.  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

## 2025-07-18 NOTE — HPI-HPI
Shahida is a pleasant 58-year-old female with history of ulcerative colitis complicated by PSC who had undergone a liver transplant several years ago and is now being followed by the [facility] hepatology clinic.  She also had a renal transplant.  She is on immunosuppressant drugs-prednisone 5 mg, tacrolimus 1 mg/day, mycophenolate Moffa till 5 mg oral twice a day.For her ulcerative colitis she is on Stelara q 4 weeks.   She is here for follow-up after her recent upper endoscopy and colonoscopy.   Upper endoscopy was unremarkable-refer to findings below.  She is on pantoprazole for acid reflux.  She denies any dyspepsia, dysphagia, early satiety or bloating or abdominal pain or nausea. Colonoscopy noted mild erythema and congestion in a patchy fashion involving the sigmoid, rectosigmoid, distal descending colon and cecum and ascending colon with relative sparing of the rest of the other parts of the colon.  Pathology in different sections of the colon taken was unremarkable showing no pathology.  No cryptitis no crypt abscesses  Clinically she is doing well without any abdominal cramping diarrhea rectal bleeding tenesmus. She is having her labs monitored at the [facility] hepatology clinic and she sees Dr. Flores every 6 months for her ulcerative colitis She also has pancreatic cysts and is on Creon for EPI. Sees Dr Flynn for surveillance. Last MRI pancreas 08/2023 with no change per patient. Will request from [facility]

## 2025-07-18 NOTE — HPI-PREVIOUS IMAGING
No previous GI imaging to review. Shahida is a pleasant 59-year-old female with history of ulcerative colitis complicated by PSC who had undergone a liver transplant several years ago and is now being followed by [facility] Hepatology clinic.  She had also undergone a renal transplant.  She is on immunosuppressants-prednisone 5 mg, tacrolimus 1 mg/day and mycophenolate Moffa till 5 mg orally twice daily. For her ulcerative colitis she reports that she is on Entyvio. She is tolerating the treatment well.  She denies any symptoms of abdominal pain cramping, fecal urgency or tenesmus bleeding.  She has multiple bowel movements a day but mostly formed. Apparently her insurance is not covering her Creon and she requested samples and also wondered if she could get off the Creon. She has a history of pancreatic cysts and is followed by Dr. Flynn who apparently is not in her network anymore.  She is due to have CT of her pancreas for cyst surveillance in November 2025.  Apparently she was placed on Creon by the transplant team several years ago-she had 1 episode of pancreatitis of unclear etiology.  She denies any history of alcohol abuse.  She currently drinks a glass of wine on Fridays and occasionally on other days. She also reports breakthrough reflux while on the pantoprazole.  She expresses concern about the adverse effects of long-term PPI use.  I recommended she add famotidine 40 mg to be taken at bedtime. Requested that she bring along her labs that she gets done every 3 months for the next visit.

## 2025-07-25 ENCOUNTER — TELEPHONE ENCOUNTER (OUTPATIENT)
Dept: URBAN - METROPOLITAN AREA CLINIC 63 | Facility: CLINIC | Age: 60
End: 2025-07-25

## 2025-07-25 RX ORDER — PANCRELIPASE 36000; 180000; 114000 [USP'U]/1; [USP'U]/1; [USP'U]/1
AS DIRECTED CAPSULE, DELAYED RELEASE PELLETS ORAL
Start: 2025-07-18

## 2025-07-25 RX ORDER — FAMOTIDINE 40 MG/1
1 TABLET TABLET, FILM COATED ORAL ONCE A DAY
Qty: 30 | Refills: 5
Start: 2025-07-18